# Patient Record
Sex: MALE | Race: WHITE | HISPANIC OR LATINO | Employment: FULL TIME | URBAN - METROPOLITAN AREA
[De-identification: names, ages, dates, MRNs, and addresses within clinical notes are randomized per-mention and may not be internally consistent; named-entity substitution may affect disease eponyms.]

---

## 2017-01-06 ENCOUNTER — CLINICAL SUPPORT (OUTPATIENT)
Dept: DIABETES | Facility: CLINIC | Age: 57
End: 2017-01-06
Payer: COMMERCIAL

## 2017-01-06 VITALS — BODY MASS INDEX: 35.98 KG/M2 | WEIGHT: 251.31 LBS | HEIGHT: 70 IN

## 2017-01-06 DIAGNOSIS — Z79.4 TYPE 2 DIABETES MELLITUS WITH HYPERGLYCEMIA, WITH LONG-TERM CURRENT USE OF INSULIN: ICD-10-CM

## 2017-01-06 DIAGNOSIS — E11.65 TYPE 2 DIABETES MELLITUS WITH HYPERGLYCEMIA, WITH LONG-TERM CURRENT USE OF INSULIN: ICD-10-CM

## 2017-01-06 PROCEDURE — 99999 PR PBB SHADOW E&M-EST. PATIENT-LVL III: CPT | Mod: PBBFAC,,,

## 2017-01-06 PROCEDURE — G0108 DIAB MANAGE TRN  PER INDIV: HCPCS | Mod: S$GLB,,, | Performed by: DIETITIAN, REGISTERED

## 2017-01-06 NOTE — PROGRESS NOTES
01/06/17 0000   Diabetes Type   Diabetes Type  Type II   Diabetes History   Diabetes Diagnosis >10 years   Nutrition   Meal Planning 3 meals per day;water;snacks between meal   Meal Plan 24 Hour Recall - Breakfast (Ham sandwich with coffee or water)   Meal Plan 24 Hour Recall - Lunch (Fruit with nuts and water to drink)   Meal Plan 24 Hour Recall - Dinner (Last night he had chicken soup - had potatoes within it and a salad)   Meal Plan 24 Hour Recall - Snack Snacks on fresh fruit   Exercise    Exercise Type (No routine exerice)   Current Diabetes Treatment    Current Treatment Oral Medicaiton;Injectable;Insulin   Social History   Preferred Learning Method Face to Face   Primary Support Self;Spouse   Educational Level College Graduate   Smoking Status Never a Smoker   Alcohol Use Weekly   Barriers to Change   Barriers to Change None   Learning Challenges  None   Readiness to Learn    Readiness to Learn  Eager   Diabetes Education Visit   Diabetes Education Record Assessment/Progress Initial/Comprehensive   Diabetes Education Assessment/Progress   Acute Complications (preventing, detecting, and treating acute complications) DC   Chronic Complications (preventing, detecting, and treating chronic complications) DC Educated patient on the importance of improving A1C to prevent complications.   Diabetes Disease Process (diabetes disease process and treatment options) DC   Nutrition (Incorporating nutritional management into one's lifestyle) DC Educated patient on plate method with carb limit set to 30-45 grams per meal and 15 grams or less per snack.  Educated patient on how to read nutrition labels for carb, protein, fiber and fat content.  Provided Carb counting and meal planning book for reference. Instructed patient on the importance of eating three times per day.   Physical Activity (incorporating physical activity into one's lifestyle) DCEducated patient on importance of exercise with goal set to exercise 30  minutes per day   Medications (states correct name, dose, onset, peak, duration, side effects & timing of meds) DC - 60 Units of Lantus - patient taking at varying times and he would titrate between 50-60 units pending blood sugar.  Educated patient on importance of taking Lantus 12 hours apart and to take same dose.  Per Dr. New recommendations - set dose to 50 Units BID - he will take at 4:30am and 5:30pm.    Patient also on Victoza 1.2 mg and has tolerated well for 3 weeks, instructed patient to increase to 1.8 mg per Dr. New recommendation    Patient tolerates Metformin 1000mg in am and 1500mg in pm and he is no longer taking Novolog as glucose WNL    Monitoring (monitoring blood glucose/other parameters &using results) DC - Log sheet provided for patient to log and bring to follow up scheduled with Dr. Goldberg on 1/24.   Goal Setting and Problem Solving (verbalizes behavior change strategies & sets realistic goals) DC   Behavior Change (developing personal strategies to health & behavior change) DC   Psychosocial Issues (deveopling personal srategies to address psychosocial concerns) DC   Goals   Healthy Eating Set   Start Date 01/06/17   Physical Activity Set   Start Date 01/06/17   Monitoring In Progress   Medications In Progress   Problem Solving In Progress   Healthy Coping In Progress   Reducing Risks In Progress   Diabetes Care Plan/Intervention   Education Plan/Intervention In F/U DSMT;Endocrine Provider Visit Set Up   Diabetes Self-Management Support Plan F/U Prov   Diabetes Meal Plan   Restrictions Restricted Carbohydrate;Low Fat   Calories 1500   Carbohydrate Per Meal 30-45g   Carbohydrate Per Snack  7-15g   Fat (Reduce intake of saturated fats)   Protein (Lean protein with meals)   Education Units of Time    Time Spent 60 min

## 2017-01-24 ENCOUNTER — LAB VISIT (OUTPATIENT)
Dept: LAB | Facility: HOSPITAL | Age: 57
End: 2017-01-24
Attending: INTERNAL MEDICINE
Payer: COMMERCIAL

## 2017-01-24 ENCOUNTER — OFFICE VISIT (OUTPATIENT)
Dept: ENDOCRINOLOGY | Facility: CLINIC | Age: 57
End: 2017-01-24
Payer: COMMERCIAL

## 2017-01-24 VITALS
SYSTOLIC BLOOD PRESSURE: 172 MMHG | HEIGHT: 70 IN | RESPIRATION RATE: 18 BRPM | BODY MASS INDEX: 35.85 KG/M2 | DIASTOLIC BLOOD PRESSURE: 91 MMHG | HEART RATE: 67 BPM | WEIGHT: 250.44 LBS

## 2017-01-24 DIAGNOSIS — I10 ESSENTIAL HYPERTENSION: ICD-10-CM

## 2017-01-24 DIAGNOSIS — N52.9 VASCULOGENIC ERECTILE DYSFUNCTION, UNSPECIFIED VASCULOGENIC ERECTILE DYSFUNCTION TYPE: ICD-10-CM

## 2017-01-24 DIAGNOSIS — E66.01 SEVERE OBESITY (BMI 35.0-39.9) WITH COMORBIDITY: ICD-10-CM

## 2017-01-24 DIAGNOSIS — K76.0 FATTY INFILTRATION OF LIVER: ICD-10-CM

## 2017-01-24 DIAGNOSIS — E78.1 HYPERTRIGLYCERIDEMIA: ICD-10-CM

## 2017-01-24 DIAGNOSIS — E11.65 TYPE 2 DIABETES MELLITUS WITH HYPERGLYCEMIA, UNSPECIFIED LONG TERM INSULIN USE STATUS: ICD-10-CM

## 2017-01-24 DIAGNOSIS — E11.65 TYPE 2 DIABETES MELLITUS WITH HYPERGLYCEMIA, UNSPECIFIED LONG TERM INSULIN USE STATUS: Primary | ICD-10-CM

## 2017-01-24 DIAGNOSIS — E55.9 HYPOVITAMINOSIS D: ICD-10-CM

## 2017-01-24 LAB
25(OH)D3+25(OH)D2 SERPL-MCNC: 36 NG/ML
ALBUMIN SERPL BCP-MCNC: 3.9 G/DL
ALP SERPL-CCNC: 41 U/L
ALT SERPL W/O P-5'-P-CCNC: 51 U/L
AMYLASE SERPL-CCNC: 110 U/L
ANION GAP SERPL CALC-SCNC: 8 MMOL/L
AST SERPL-CCNC: 46 U/L
BASOPHILS # BLD AUTO: 0.01 K/UL
BASOPHILS NFR BLD: 0.2 %
BILIRUB SERPL-MCNC: 1.7 MG/DL
BUN SERPL-MCNC: 16 MG/DL
CA-I BLDV-SCNC: 1.34 MMOL/L
CALCIUM SERPL-MCNC: 10.2 MG/DL
CHLORIDE SERPL-SCNC: 102 MMOL/L
CHOLEST/HDLC SERPL: 3.2 {RATIO}
CO2 SERPL-SCNC: 28 MMOL/L
CREAT SERPL-MCNC: 1.2 MG/DL
DIFFERENTIAL METHOD: ABNORMAL
EOSINOPHIL # BLD AUTO: 0.1 K/UL
EOSINOPHIL NFR BLD: 0.8 %
ERYTHROCYTE [DISTWIDTH] IN BLOOD BY AUTOMATED COUNT: 13.4 %
EST. GFR  (AFRICAN AMERICAN): >60 ML/MIN/1.73 M^2
EST. GFR  (NON AFRICAN AMERICAN): >60 ML/MIN/1.73 M^2
GLUCOSE SERPL-MCNC: 58 MG/DL
HCT VFR BLD AUTO: 41.7 %
HDL/CHOLESTEROL RATIO: 31.6 %
HDLC SERPL-MCNC: 31 MG/DL
HDLC SERPL-MCNC: 98 MG/DL
HGB BLD-MCNC: 14.1 G/DL
LDLC SERPL CALC-MCNC: 48.4 MG/DL
LIPASE SERPL-CCNC: 61 U/L
LYMPHOCYTES # BLD AUTO: 2.3 K/UL
LYMPHOCYTES NFR BLD: 34.2 %
MCH RBC QN AUTO: 30.9 PG
MCHC RBC AUTO-ENTMCNC: 33.8 %
MCV RBC AUTO: 91 FL
MONOCYTES # BLD AUTO: 0.6 K/UL
MONOCYTES NFR BLD: 8.3 %
NEUTROPHILS # BLD AUTO: 3.7 K/UL
NEUTROPHILS NFR BLD: 56.3 %
NONHDLC SERPL-MCNC: 67 MG/DL
PLATELET # BLD AUTO: 185 K/UL
PMV BLD AUTO: 11.1 FL
POTASSIUM SERPL-SCNC: 3.9 MMOL/L
PROT SERPL-MCNC: 7.6 G/DL
PTH-INTACT SERPL-MCNC: 30 PG/ML
RBC # BLD AUTO: 4.56 M/UL
SODIUM SERPL-SCNC: 138 MMOL/L
TRIGL SERPL-MCNC: 93 MG/DL
URATE SERPL-MCNC: 6.9 MG/DL
WBC # BLD AUTO: 6.63 K/UL

## 2017-01-24 PROCEDURE — 84244 ASSAY OF RENIN: CPT

## 2017-01-24 PROCEDURE — 82330 ASSAY OF CALCIUM: CPT

## 2017-01-24 PROCEDURE — 85025 COMPLETE CBC W/AUTO DIFF WBC: CPT

## 2017-01-24 PROCEDURE — 82306 VITAMIN D 25 HYDROXY: CPT

## 2017-01-24 PROCEDURE — 84378 SUGARS SINGLE QUANT: CPT

## 2017-01-24 PROCEDURE — 84550 ASSAY OF BLOOD/URIC ACID: CPT

## 2017-01-24 PROCEDURE — 3077F SYST BP >= 140 MM HG: CPT | Mod: S$GLB,,, | Performed by: INTERNAL MEDICINE

## 2017-01-24 PROCEDURE — 3080F DIAST BP >= 90 MM HG: CPT | Mod: S$GLB,,, | Performed by: INTERNAL MEDICINE

## 2017-01-24 PROCEDURE — 82088 ASSAY OF ALDOSTERONE: CPT

## 2017-01-24 PROCEDURE — 83970 ASSAY OF PARATHORMONE: CPT

## 2017-01-24 PROCEDURE — 36415 COLL VENOUS BLD VENIPUNCTURE: CPT | Mod: PO

## 2017-01-24 PROCEDURE — 3045F PR MOST RECENT HEMOGLOBIN A1C LEVEL 7.0-9.0%: CPT | Mod: S$GLB,,, | Performed by: INTERNAL MEDICINE

## 2017-01-24 PROCEDURE — 83690 ASSAY OF LIPASE: CPT

## 2017-01-24 PROCEDURE — 99999 PR PBB SHADOW E&M-EST. PATIENT-LVL III: CPT | Mod: PBBFAC,,, | Performed by: INTERNAL MEDICINE

## 2017-01-24 PROCEDURE — 80061 LIPID PANEL: CPT

## 2017-01-24 PROCEDURE — 1159F MED LIST DOCD IN RCRD: CPT | Mod: S$GLB,,, | Performed by: INTERNAL MEDICINE

## 2017-01-24 PROCEDURE — 82308 ASSAY OF CALCITONIN: CPT

## 2017-01-24 PROCEDURE — 83036 HEMOGLOBIN GLYCOSYLATED A1C: CPT

## 2017-01-24 PROCEDURE — 82985 ASSAY OF GLYCATED PROTEIN: CPT

## 2017-01-24 PROCEDURE — 82150 ASSAY OF AMYLASE: CPT

## 2017-01-24 PROCEDURE — 4010F ACE/ARB THERAPY RXD/TAKEN: CPT | Mod: S$GLB,,, | Performed by: INTERNAL MEDICINE

## 2017-01-24 PROCEDURE — 80053 COMPREHEN METABOLIC PANEL: CPT

## 2017-01-24 PROCEDURE — 99214 OFFICE O/P EST MOD 30 MIN: CPT | Mod: S$GLB,,, | Performed by: INTERNAL MEDICINE

## 2017-01-24 RX ORDER — SPIRONOLACTONE 25 MG/1
25 TABLET ORAL DAILY
Qty: 90 TABLET | Refills: 3 | Status: SHIPPED | OUTPATIENT
Start: 2017-01-24 | End: 2017-08-04 | Stop reason: ALTCHOICE

## 2017-01-24 RX ORDER — HYDROCHLOROTHIAZIDE 12.5 MG/1
12.5 TABLET ORAL DAILY
Qty: 90 TABLET | Refills: 3 | Status: SHIPPED | OUTPATIENT
Start: 2017-01-24 | End: 2017-12-22 | Stop reason: SDUPTHER

## 2017-01-24 NOTE — PROGRESS NOTES
Subjective:      Patient ID: Jorge Sharp is a 56 y.o. male.    Chief Complaint:  56 yr old gentleman with type 2 diabetes seen in [facility] today for glycemia optimization.    History of Present Illness    Patient is a 56 yr old gentleman with type 2 diabetes seen in up today for glycemia optimization. He had apparently been seen in the past by Dr Washington but that was over 3 yrs ago. He in addition has essential hypertension, hyperlipidemia with hypercholesterolemia and hyperTG, ED, grade 2 obesity and dysmetabolic syndrome as well as a past history of cholestatic jaundice and NAFLD.  Patients baseline [place] score 13. Patient has not had a sleep study done but has had this recommended.  Patient has been diabetic for ~ 26 yrs.   Patient has a history of major weight changes and got to a maximum weight of 303lbs.  Patient is on metformin 2500mg TDD, Lantus 50 units TWICE DAILY, Novolog 20units TID (of same presently) and Victoza 1.8mcg QD  He does SMBGs 2-4 x daily. Patient has a one touch verio fasting values under 100, HS values; 100-140  Patients most recent HBA1c was 7.0 from 09/16.  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  He has no fresh complaints today.    Review of Systems   Constitutional: Negative for diaphoresis and fatigue.   HENT: Negative for facial swelling and trouble swallowing.    Eyes: Negative for visual disturbance.   Respiratory: Negative for cough, shortness of breath and wheezing.    Cardiovascular: Positive for leg swelling (much improved). Negative for chest pain and palpitations.   Gastrointestinal: Negative for abdominal pain, diarrhea, nausea and vomiting.   Endocrine: Negative for polyuria.   Genitourinary: Negative for dysuria and frequency.   Musculoskeletal: Negative for arthralgias, gait problem and myalgias.   Skin: Negative for color change, pallor and rash.  "  Neurological: Negative for dizziness, light-headedness and headaches.   Hematological: Does not bruise/bleed easily.   Psychiatric/Behavioral: Negative for confusion. The patient is not nervous/anxious.        Objective:  Visit Vitals    BP (!) 172/91    Pulse 67    Resp 18    Ht 5' 10" (1.778 m)    Wt 113.6 kg (250 lb 7.1 oz)    BMI 35.93 kg/m2   BP on recheck; 164/100mmhg     Physical Exam   Constitutional: He is oriented to person, place, and time. He appears well-developed and well-nourished. No distress.   Pleasant middle aged gentleman. Not pale, anicteric and afebrile. Well hydrated and not in any apparent distress.   HENT:   Head: Normocephalic and atraumatic.   Eyes: Conjunctivae and EOM are normal. Pupils are equal, round, and reactive to light. No scleral icterus.   Neck: Normal range of motion. Neck supple. No JVD present. No thyromegaly present.   Cardiovascular: Normal rate, regular rhythm and normal heart sounds.    Pulmonary/Chest: Effort normal and breath sounds normal. No respiratory distress. He has no wheezes.   Abdominal: Soft. There is no tenderness.   Musculoskeletal: Normal range of motion. He exhibits edema (minimal bipedal edema). He exhibits no tenderness.   Neurological: He is alert and oriented to person, place, and time. No cranial nerve deficit.   Skin: Skin is warm and dry. He is not diaphoretic.   Psychiatric: He has a normal mood and affect. His behavior is normal. Judgment and thought content normal.   Vitals reviewed.      Lab Review:     Results for JAIDEN TAN (MRN [id number]) as of 1/24/2017 15:08   Ref. Range 9/16/2016 10:14 9/16/2016 10:23 9/16/2016 10:23   WBC Latest Ref Range: 3.90 - 12.70 K/uL  5.97    RBC Latest Ref Range: 4.60 - 6.20 M/uL  4.60    Hemoglobin Latest Ref Range: 14.0 - 18.0 g/dL  14.7    Hematocrit Latest Ref Range: 40.0 - 54.0 %  42.0    MCV Latest Ref Range: 82 - 98 fL  91    MCH Latest Ref Range: 27.0 - 31.0 pg  32.0 (H)    MCHC Latest " Ref Range: 32.0 - 36.0 %  35.0    RDW Latest Ref Range: 11.5 - 14.5 %  13.1    Platelets Latest Ref Range: 150 - 350 K/uL  140 (L)    MPV Latest Ref Range: 9.2 - 12.9 fL  12.1    Gran% Latest Ref Range: 38.0 - 73.0 %  60.8    Gran # Latest Ref Range: 1.8 - 7.7 K/uL  3.6    Lymph% Latest Ref Range: 18.0 - 48.0 %  26.6    Lymph # Latest Ref Range: 1.0 - 4.8 K/uL  1.6    Mono% Latest Ref Range: 4.0 - 15.0 %  11.1    Mono # Latest Ref Range: 0.3 - 1.0 K/uL  0.7    Eosinophil% Latest Ref Range: 0.0 - 8.0 %  1.2    Eos # Latest Ref Range: 0.0 - 0.5 K/uL  0.1    Basophil% Latest Ref Range: 0.0 - 1.9 %  0.3    Baso # Latest Ref Range: 0.00 - 0.20 K/uL  0.02    Aniso Unknown  Slight    Hypo Unknown  Occasional    Platelet Estimate Unknown  Decreased (A)    Sodium Latest Ref Range: 136 - 145 mmol/L  138    Potassium Latest Ref Range: 3.5 - 5.1 mmol/L  4.7    Chloride Latest Ref Range: 95 - 110 mmol/L  105    CO2 Latest Ref Range: 23 - 29 mmol/L  25    Anion Gap Latest Ref Range: 8 - 16 mmol/L  8    BUN, Bld Latest Ref Range: 6 - 20 mg/dL  16    Creatinine Latest Ref Range: 0.5 - 1.4 mg/dL  1.3    eGFR if non African American Latest Ref Range: >60 mL/min/1.73 m^2  >60.0    eGFR if African American Latest Ref Range: >60 mL/min/1.73 m^2  >60.0    Glucose Latest Ref Range: 70 - 110 mg/dL  219 (H)    Calcium Latest Ref Range: 8.7 - 10.5 mg/dL  9.4    Alkaline Phosphatase Latest Ref Range: 55 - 135 U/L  42 (L)    Total Protein Latest Ref Range: 6.0 - 8.4 g/dL  6.9    Albumin Latest Ref Range: 3.5 - 5.2 g/dL  3.6    Uric Acid Latest Ref Range: 3.4 - 7.0 mg/dL  6.4 6.4   Total Bilirubin Latest Ref Range: 0.1 - 1.0 mg/dL  1.4 (H)    AST Latest Ref Range: 10 - 40 U/L  56 (H)    ALT Latest Ref Range: 10 - 44 U/L  88 (H)    Amylase Latest Ref Range: 20 - 110 U/L  43    Lipase Latest Ref Range: 4 - 60 U/L  27    Vit D, 25-Hydroxy Latest Ref Range: 30 - 96 ng/mL  18 (L)    Hemoglobin A1C Latest Ref Range: 4.5 - 6.2 %  7.0 (H)    Estimated  Avg Glucose Latest Ref Range: 68 - 131 mg/dL  154 (H)    GlycoMark (TM) Latest Units: ug/mL  4.6 (L)    TSH Latest Ref Range: 0.400 - 4.000 uIU/mL  1.798    T3, Total Latest Ref Range: 60 - 180 ng/dL  93    Calcitonin Latest Units: pg/mL  <5.0    Specimen UA Unknown Urine, Clean Catch     Color, UA Latest Ref Range: Yellow, Straw, Margo  Yellow     pH, UA Latest Ref Range: 5.0 - 8.0  6.0     Specific Gravity, UA Latest Ref Range: 1.005 - 1.030  1.020     Appearance, UA Latest Ref Range: Clear  Clear     Protein, UA Latest Ref Range: Negative  Negative     Glucose, UA Latest Ref Range: Negative  3+ (A)     Ketones, UA Latest Ref Range: Negative  Negative     Occult Blood UA Latest Ref Range: Negative  Negative     Nitrite, UA Latest Ref Range: Negative  Negative     Urobilinogen, UA Latest Ref Range: <2.0 EU/dL Negative     Bilirubin (UA) Latest Ref Range: Negative  Negative     Leukocytes, UA Latest Ref Range: Negative  Negative     RBC, UA Latest Ref Range: 0 - 4 /hpf 1     WBC, UA Latest Ref Range: 0 - 5 /hpf 1     Bacteria, UA Latest Ref Range: None-Occ /hpf None     Yeast, UA Latest Ref Range: None  None     Squam Epithel, UA Latest Units: /hpf 1     Microscopic Comment Unknown SEE COMMENT     Microalbum.,U,Random Latest Units: ug/mL 28.0     Microalb Creat Ratio Latest Ref Range: 0.0 - 30.0 ug/mg 15.2     Fructosamine Latest Ref Range: 0 - 285 umol /L  284    Creatinine, Random Ur Latest Ref Range: 23.0 - 375.0 mg/dL 184.0     Differential Method Unknown  Automated        Assessment:     1. Type 2 diabetes mellitus with hyperglycemia, unspecified long term insulin use status  Hemoglobin A1c    Fructosamine    GlycoMark (TM)    Amylase    Calcitonin    Lipase    Comprehensive metabolic panel    CBC auto differential    Uric acid    Urinalysis    Microalbumin/creatinine urine ratio   2. Fatty infiltration of liver     3. Vasculogenic erectile dysfunction, unspecified vasculogenic erectile dysfunction type     4.  Essential hypertension  Comprehensive metabolic panel    Urinalysis    Microalbumin/creatinine urine ratio    Aldosterone    Renin    PTH, intact    Calcium, ionized    hydrochlorothiazide (HYDRODIURIL) 12.5 MG Tab   5. Hypertriglyceridemia  Comprehensive metabolic panel    Lipid panel   6. Hypovitaminosis D  Vitamin D    PTH, intact    Calcium, ionized   7. Severe obesity (BMI 35.0-39.9) with comorbidity  Vitamin D    Uric acid    Lipid panel        Regarding type 2 diabetes; to continue  metformin @  2500mg TDD (add on extra 500mg at PM).  To continue Victoza 1.8mg QD and continue lantus but reduce dose to 40 units BID.  Regarding NAFLD; to track transaminase levels. May consider addition of ursodiol and/or actos if transaminitis recurs.  Regarding obesity; He has achieved some significant weight loss since last visit. To continue efforts at calorie reduction and regular physical activity.   Regarding hypertension; Systolic BP today suboptimally controlled. To add on low dose HCTZ; 12.5mg QD to present antihypertensive regimen. To ensure daily ambulatory BP trends and send in results for review in ~ 1mth.  Regarding hyperlipidemia; most recent lipid panel is essentially at goal. To continue low dose asa as before for primary ASCVD prophylaxis.  Regarding chronic sleep deprivation; much improved with interval weight loss.  Regarding hypovitaminosis D; to continue vitamin D repletion therapy and will recheck 25 Oh vitamin D levels today.    Plan:     FFup in ~ 3mths

## 2017-01-24 NOTE — MR AVS SNAPSHOT
[facility]  [address] [facility] [facility] [address]  Phone: [phone]                  Jorge Sharp   2017 3:00 PM   Office Visit    Description:  Male : 1960   Provider:  Rex Goldberg MD   Department:  [facility]           Diagnoses this Visit        Comments    Type 2 diabetes mellitus with hyperglycemia, unspecified long term insulin use status    -  Primary     Fatty infiltration of liver         Vasculogenic erectile dysfunction, unspecified vasculogenic erectile dysfunction type         Essential hypertension         Hypertriglyceridemia         Hypovitaminosis D         Severe obesity (BMI 35.0-39.9) with comorbidity                To Do List           Future Appointments        Provider Department Dept Phone    2017 4:00 PM LAB, [facility] - Lab [phone]    2017 4:15 PM LAB, [facility] [phone]    3/17/2017 8:00 AM LAB, [facility] - Lab [phone]    3/31/2017 8:40 AM [facility] - [facility] Medicine [phone]    2017 1:30 PM MD [facility] - [facility]Diabetes [phone]      Goals (5 Years of Data)     None       These Medications        Disp Refills Start End    hydrochlorothiazide (HYDRODIURIL) 12.5 MG Tab 90 tablet 3 2017    Take 1 tablet (12.5 mg total) by mouth once daily. - Oral    Pharmacy: [facility] [address] [place] [address] [address]. Ph #: [phone]       spironolactone (ALDACTONE) 25 MG tablet 90 tablet 3 2017    Take 1 tablet (25 mg total) by mouth once daily. - Oral    Pharmacy: [facility] [address] [place], [place] [address] [address]. Ph #: [phone]         [facility]     [facility] Care [facility] -  Assistance  Registered nurses in the [facility] Center provide clinical advisement, health education, appointment booking, and other advisory  services.  Call for this free service at [phone].             Medications           Message regarding Medications     Verify the changes and/or additions to your medication regime listed below are the same as discussed with your clinician today.  If any of these changes or additions are incorrect, please notify your healthcare provider.        START taking these NEW medications        Refills    hydrochlorothiazide (HYDRODIURIL) 12.5 MG Tab 3    Sig: Take 1 tablet (12.5 mg total) by mouth once daily.    Class: Normal    Route: Oral      CHANGE how you are taking these medications     Start Taking Instead of    spironolactone (ALDACTONE) 25 MG tablet spironolactone (ALDACTONE) 25 MG tablet    Dosage:  Take 1 tablet (25 mg total) by mouth once daily. Dosage:  TAKE ONE TABLET BY MOUTH ONCE DAILY    Reason for Change:  Reorder            Verify that the below list of medications is an accurate representation of the medications you are currently taking.  If none reported, the list may be blank. If incorrect, please contact your healthcare provider. Carry this list with you in case of emergency.           Current Medications     COCONUT OIL ORAL Take by mouth.    cod liver oil Cap Take 1 capsule by mouth 2 (two) times daily.    doxazosin (CARDURA) 4 MG tablet TAKE ONE TABLET BY MOUTH ONCE DAILY IN THE EVENING    hydrALAZINE (APRESOLINE) 100 MG tablet Take 1 tablet (100 mg total) by mouth 2 (two) times daily.    insulin aspart (NOVOLOG FLEXPEN) 100 unit/mL InPn pen INJECT 10 TO 20 UNITS SUBCUTANEOUSLY BEFORE LUNCH AND DINNER    insulin glargine (LANTUS SOLOSTAR) 100 unit/mL (3 mL) InPn pen Inject 60 Units into the skin 2 (two) times daily.    lancets (ONE TOUCH DELICA LANCETS) 33 gauge Misc 1 lancet by Misc.(Non-Drug; Combo Route) route 2 hours after meals and at bedtime.    liraglutide 0.6 mg/0.1 mL, 18 mg/3 mL, subq PNIJ (VICTOZA 2-ROSARIO) 0.6 mg/0.1 mL (18 mg/3 mL) PnIj Inject 0.6 mg into the skin once daily.     "liraglutide 0.6 mg/0.1 mL, 18 mg/3 mL, subq PNIJ (VICTOZA 3-ROSARIO) 0.6 mg/0.1 mL (18 mg/3 mL) PnIj Inject 1.2 mg into the skin once daily.    lisinopril (PRINIVIL,ZESTRIL) 40 MG tablet Take 1 tablet (40 mg total) by mouth once daily.    metformin (GLUCOPHAGE) 1000 MG tablet TAKE ONE TABLET BY MOUTH TWICE DAILY WITH MEALS    metoprolol succinate (TOPROL-XL) 100 MG 24 hr tablet TAKE ONE TABLET BY MOUTH ONCE DAILY    multivitamin capsule Take 1 capsule by mouth once daily.    ONETOUCH VERIO Strp CHECK BLOOD SUGAR BEFORE MEALS AND AT BEDTIME (FOUR TIMES A DAY)    pen needle, diabetic (BD ULTRA-FINE MADDIE PEN NEEDLES) 32 gauge x 5/32" Ndle USE AS DIRECTED x 4 a day    spironolactone (ALDACTONE) 25 MG tablet Take 1 tablet (25 mg total) by mouth once daily.    aspirin 81 MG Chew Take 1 tablet (81 mg total) by mouth once daily.    hydrochlorothiazide (HYDRODIURIL) 12.5 MG Tab Take 1 tablet (12.5 mg total) by mouth once daily.    omeprazole (PRILOSEC) 20 MG capsule Take 1 capsule (20 mg total) by mouth once daily.           Clinical Reference Information           Vital Signs - Last Recorded  Most recent update: 1/24/2017  3:02 PM by Frida Bear RN    BP Pulse Resp Ht Wt BMI    (!) 172/91 67 18 5' 10" (1.778 m) 113.6 kg (250 lb 7.1 oz) 35.93 kg/m2      Blood Pressure          Most Recent Value    BP  (!)  172/91      Allergies as of 1/24/2017     No Known Allergies      Immunizations Administered on Date of Encounter - 1/24/2017     None      Orders Placed During Today's Visit     Future Labs/Procedures Expected by Expires    Aldosterone  1/24/2017 3/25/2018    Amylase  1/24/2017 3/25/2018    Calcitonin  1/24/2017 3/25/2018    Calcium, ionized  1/24/2017 3/25/2018    CBC auto differential  1/24/2017 3/25/2018    Comprehensive metabolic panel  1/24/2017 3/25/2018    Fructosamine  1/24/2017 3/25/2018    GlycoMark (TM)  1/24/2017 3/25/2018    Hemoglobin A1c  1/24/2017 3/25/2018    Lipase  1/24/2017 3/25/2018    Lipid panel  " 1/24/2017 3/25/2018    Microalbumin/creatinine urine ratio  1/24/2017 3/25/2018    PTH, intact  1/24/2017 3/25/2018    Renin  1/24/2017 3/25/2018    Uric acid  1/24/2017 3/25/2018    Urinalysis  1/24/2017 3/25/2018    Vitamin D  1/24/2017 3/25/2018

## 2017-01-25 LAB
ESTIMATED AVG GLUCOSE: 126 MG/DL
HBA1C MFR BLD HPLC: 6 %

## 2017-01-26 DIAGNOSIS — I10 ESSENTIAL HYPERTENSION: Chronic | ICD-10-CM

## 2017-01-26 LAB
ALDOST SERPL-MCNC: 9.7 NG/DL
CALCIT SERPL-MCNC: <5 PG/ML

## 2017-01-26 RX ORDER — LISINOPRIL 40 MG/1
40 TABLET ORAL DAILY
Qty: 90 TABLET | Refills: 0 | Status: SHIPPED | OUTPATIENT
Start: 2017-01-26 | End: 2017-04-17 | Stop reason: SDUPTHER

## 2017-01-27 LAB
FRUCTOSAMINE SERPL-SCNC: 241 UMOL /L (ref 0–285)
RENIN PLAS-CCNC: 0.8 NG/ML/H

## 2017-01-28 LAB — GLYCOMARK (TM): 9.4 UG/ML

## 2017-01-29 ENCOUNTER — PATIENT MESSAGE (OUTPATIENT)
Dept: ENDOCRINOLOGY | Facility: CLINIC | Age: 57
End: 2017-01-29

## 2017-03-04 ENCOUNTER — PATIENT MESSAGE (OUTPATIENT)
Dept: FAMILY MEDICINE | Facility: CLINIC | Age: 57
End: 2017-03-04

## 2017-03-04 ENCOUNTER — PATIENT MESSAGE (OUTPATIENT)
Dept: ENDOCRINOLOGY | Facility: CLINIC | Age: 57
End: 2017-03-04

## 2017-03-06 ENCOUNTER — TELEPHONE (OUTPATIENT)
Dept: ENDOCRINOLOGY | Facility: CLINIC | Age: 57
End: 2017-03-06

## 2017-03-06 NOTE — TELEPHONE ENCOUNTER
----- Message from Nisha Deglado sent at 3/6/2017 10:57 AM CST -----  Contact: Jasvir at [facility] [phone]  Jasvir at [facility] [phone] have a question about the qty

## 2017-03-17 ENCOUNTER — LAB VISIT (OUTPATIENT)
Dept: LAB | Facility: HOSPITAL | Age: 57
End: 2017-03-17
Attending: FAMILY MEDICINE
Payer: COMMERCIAL

## 2017-03-17 DIAGNOSIS — E11.65 TYPE 2 DIABETES MELLITUS WITH HYPERGLYCEMIA, WITH LONG-TERM CURRENT USE OF INSULIN: ICD-10-CM

## 2017-03-17 DIAGNOSIS — K76.0 FATTY INFILTRATION OF LIVER: ICD-10-CM

## 2017-03-17 DIAGNOSIS — K75.89 HEPATITIS CHOLESTATIC: ICD-10-CM

## 2017-03-17 DIAGNOSIS — Z79.4 TYPE 2 DIABETES MELLITUS WITH HYPERGLYCEMIA, WITH LONG-TERM CURRENT USE OF INSULIN: ICD-10-CM

## 2017-03-17 LAB
AFP SERPL-MCNC: 1.9 NG/ML
ALBUMIN SERPL BCP-MCNC: 3.9 G/DL
ALP SERPL-CCNC: 43 U/L
ALT SERPL W/O P-5'-P-CCNC: 49 U/L
AMMONIA PLAS-SCNC: 31 UMOL/L
ANION GAP SERPL CALC-SCNC: 7 MMOL/L
AST SERPL-CCNC: 37 U/L
BASOPHILS # BLD AUTO: 0.01 K/UL
BASOPHILS NFR BLD: 0.2 %
BILIRUB SERPL-MCNC: 1.3 MG/DL
BUN SERPL-MCNC: 19 MG/DL
CALCIUM SERPL-MCNC: 10.2 MG/DL
CHLORIDE SERPL-SCNC: 101 MMOL/L
CHOLEST/HDLC SERPL: 5.5 {RATIO}
CO2 SERPL-SCNC: 31 MMOL/L
CREAT SERPL-MCNC: 1.4 MG/DL
DIFFERENTIAL METHOD: ABNORMAL
EOSINOPHIL # BLD AUTO: 0 K/UL
EOSINOPHIL NFR BLD: 0.5 %
ERYTHROCYTE [DISTWIDTH] IN BLOOD BY AUTOMATED COUNT: 13.7 %
EST. GFR  (AFRICAN AMERICAN): >60 ML/MIN/1.73 M^2
EST. GFR  (NON AFRICAN AMERICAN): 56.2 ML/MIN/1.73 M^2
GLUCOSE SERPL-MCNC: 101 MG/DL
HCT VFR BLD AUTO: 43.7 %
HDL/CHOLESTEROL RATIO: 18 %
HDLC SERPL-MCNC: 194 MG/DL
HDLC SERPL-MCNC: 35 MG/DL
HGB BLD-MCNC: 14.6 G/DL
INR PPP: 1
LDLC SERPL CALC-MCNC: 129.2 MG/DL
LYMPHOCYTES # BLD AUTO: 1.6 K/UL
LYMPHOCYTES NFR BLD: 27.6 %
MCH RBC QN AUTO: 31.1 PG
MCHC RBC AUTO-ENTMCNC: 33.4 %
MCV RBC AUTO: 93 FL
MONOCYTES # BLD AUTO: 0.4 K/UL
MONOCYTES NFR BLD: 7.2 %
NEUTROPHILS # BLD AUTO: 3.8 K/UL
NEUTROPHILS NFR BLD: 64.2 %
NONHDLC SERPL-MCNC: 159 MG/DL
PLATELET # BLD AUTO: 186 K/UL
PMV BLD AUTO: 11.2 FL
POTASSIUM SERPL-SCNC: 4.1 MMOL/L
PROT SERPL-MCNC: 7.7 G/DL
PROTHROMBIN TIME: 10.6 SEC
RBC # BLD AUTO: 4.7 M/UL
SODIUM SERPL-SCNC: 139 MMOL/L
TRIGL SERPL-MCNC: 149 MG/DL
WBC # BLD AUTO: 5.84 K/UL

## 2017-03-17 PROCEDURE — 80061 LIPID PANEL: CPT

## 2017-03-17 PROCEDURE — 82140 ASSAY OF AMMONIA: CPT

## 2017-03-17 PROCEDURE — 83036 HEMOGLOBIN GLYCOSYLATED A1C: CPT

## 2017-03-17 PROCEDURE — 85025 COMPLETE CBC W/AUTO DIFF WBC: CPT

## 2017-03-17 PROCEDURE — 36415 COLL VENOUS BLD VENIPUNCTURE: CPT | Mod: PO

## 2017-03-17 PROCEDURE — 82105 ALPHA-FETOPROTEIN SERUM: CPT

## 2017-03-17 PROCEDURE — 85610 PROTHROMBIN TIME: CPT

## 2017-03-17 PROCEDURE — 80053 COMPREHEN METABOLIC PANEL: CPT

## 2017-03-18 LAB
ESTIMATED AVG GLUCOSE: 120 MG/DL
HBA1C MFR BLD HPLC: 5.8 %

## 2017-03-20 ENCOUNTER — DOCUMENTATION ONLY (OUTPATIENT)
Dept: FAMILY MEDICINE | Facility: CLINIC | Age: 57
End: 2017-03-20

## 2017-03-20 NOTE — PROGRESS NOTES
Pre-Visit Chart Review  For Appointment Scheduled on 3/31/17    Health Maintenance Due   Topic Date Due    Influenza Vaccine  08/01/2016

## 2017-03-22 ENCOUNTER — PATIENT MESSAGE (OUTPATIENT)
Dept: FAMILY MEDICINE | Facility: CLINIC | Age: 57
End: 2017-03-22

## 2017-03-22 ENCOUNTER — PATIENT MESSAGE (OUTPATIENT)
Dept: ENDOCRINOLOGY | Facility: CLINIC | Age: 57
End: 2017-03-22

## 2017-03-24 DIAGNOSIS — Z79.4 TYPE 2 DIABETES MELLITUS WITH HYPERGLYCEMIA, WITH LONG-TERM CURRENT USE OF INSULIN: Primary | ICD-10-CM

## 2017-03-24 DIAGNOSIS — E11.65 TYPE 2 DIABETES MELLITUS WITH HYPERGLYCEMIA, WITH LONG-TERM CURRENT USE OF INSULIN: Primary | ICD-10-CM

## 2017-03-24 RX ORDER — INSULIN GLARGINE 100 [IU]/ML
50 INJECTION, SOLUTION SUBCUTANEOUS 2 TIMES DAILY
Qty: 90 ML | Refills: 3 | Status: SHIPPED | OUTPATIENT
Start: 2017-03-24 | End: 2017-08-04

## 2017-03-31 ENCOUNTER — OFFICE VISIT (OUTPATIENT)
Dept: FAMILY MEDICINE | Facility: CLINIC | Age: 57
End: 2017-03-31
Payer: COMMERCIAL

## 2017-03-31 VITALS
SYSTOLIC BLOOD PRESSURE: 150 MMHG | RESPIRATION RATE: 14 BRPM | DIASTOLIC BLOOD PRESSURE: 90 MMHG | OXYGEN SATURATION: 95 % | HEIGHT: 70 IN | WEIGHT: 242.5 LBS | HEART RATE: 73 BPM | BODY MASS INDEX: 34.72 KG/M2

## 2017-03-31 DIAGNOSIS — E11.8 CONTROLLED TYPE 2 DIABETES MELLITUS WITH COMPLICATION, WITH LONG-TERM CURRENT USE OF INSULIN: Primary | ICD-10-CM

## 2017-03-31 DIAGNOSIS — L85.3 DRY SKIN DERMATITIS: ICD-10-CM

## 2017-03-31 DIAGNOSIS — K75.89 HEPATITIS CHOLESTATIC: ICD-10-CM

## 2017-03-31 DIAGNOSIS — E66.9 OBESITY (BMI 30.0-34.9): ICD-10-CM

## 2017-03-31 DIAGNOSIS — K76.0 FATTY INFILTRATION OF LIVER: ICD-10-CM

## 2017-03-31 DIAGNOSIS — Z79.4 CONTROLLED TYPE 2 DIABETES MELLITUS WITH COMPLICATION, WITH LONG-TERM CURRENT USE OF INSULIN: Primary | ICD-10-CM

## 2017-03-31 DIAGNOSIS — I10 ESSENTIAL HYPERTENSION: ICD-10-CM

## 2017-03-31 PROCEDURE — 3044F HG A1C LEVEL LT 7.0%: CPT | Mod: S$GLB,,, | Performed by: PHYSICIAN ASSISTANT

## 2017-03-31 PROCEDURE — 3077F SYST BP >= 140 MM HG: CPT | Mod: S$GLB,,, | Performed by: PHYSICIAN ASSISTANT

## 2017-03-31 PROCEDURE — 3080F DIAST BP >= 90 MM HG: CPT | Mod: S$GLB,,, | Performed by: PHYSICIAN ASSISTANT

## 2017-03-31 PROCEDURE — 99214 OFFICE O/P EST MOD 30 MIN: CPT | Mod: S$GLB,,, | Performed by: PHYSICIAN ASSISTANT

## 2017-03-31 PROCEDURE — 4010F ACE/ARB THERAPY RXD/TAKEN: CPT | Mod: S$GLB,,, | Performed by: PHYSICIAN ASSISTANT

## 2017-03-31 PROCEDURE — 1160F RVW MEDS BY RX/DR IN RCRD: CPT | Mod: S$GLB,,, | Performed by: PHYSICIAN ASSISTANT

## 2017-03-31 PROCEDURE — 99999 PR PBB SHADOW E&M-EST. PATIENT-LVL V: CPT | Mod: PBBFAC,,, | Performed by: PHYSICIAN ASSISTANT

## 2017-03-31 NOTE — PROGRESS NOTES
"Subjective:       Patient ID: Jorge Sharp is a 56 y.o. male.    Chief Complaint: Follow-up (3mth f/u hypertension / diabetes)    HPI Comments: Mr. Sharp comes to clinic today for 3 month follow up. He recently had blood work completed. His HGA1c is 5.8. The patient's lipid panel is stable. CMP shows chronically elevated liver function test; he is overdue to have his abdominal ultrasound updated. The patient reports he did take his blood pressure medication today though his readings remain elevated. The patient does complain of "itchy dry skin" on his back. He would like this evaluated today.    Review of Systems   Constitutional: Negative for activity change, appetite change and fever.   HENT: Negative for postnasal drip, rhinorrhea and sinus pressure.    Eyes: Negative for visual disturbance.   Respiratory: Negative for cough and shortness of breath.    Cardiovascular: Negative for chest pain.   Gastrointestinal: Negative for abdominal distention and abdominal pain.   Genitourinary: Negative for difficulty urinating and dysuria.   Musculoskeletal: Negative for arthralgias and myalgias.   Skin:        "dry itchy skin"   Neurological: Negative for headaches.   Hematological: Negative for adenopathy.   Psychiatric/Behavioral: The patient is not nervous/anxious.        Objective:      Physical Exam   Constitutional: He is oriented to person, place, and time.   HENT:   Mouth/Throat: Oropharynx is clear and moist. No oropharyngeal exudate.   Cardiovascular: Normal rate and regular rhythm.    Pulmonary/Chest: Effort normal and breath sounds normal. He has no wheezes.   Abdominal: Soft. Bowel sounds are normal. There is no tenderness.   Musculoskeletal: He exhibits no edema.   Lymphadenopathy:     He has no cervical adenopathy.   Neurological: He is alert and oriented to person, place, and time.   Skin: No erythema.   Skin of left mid back: dry skin patch. No lesions no rash present   Psychiatric: His " behavior is normal.       Assessment:       1. Controlled type 2 diabetes mellitus with complication, with long-term current use of insulin    2. Hepatitis cholestatic    3. Fatty infiltration of liver    4. Essential hypertension    5. Obesity (BMI 30.0-34.9)    6. Dry skin dermatitis        Plan:   Jorge was seen today for follow-up.    Diagnoses and all orders for this visit:    Controlled type 2 diabetes mellitus with complication, with long-term current use of insulin  -     Hemoglobin A1c; Future  -     Lipid panel; Future  -     Comprehensive metabolic panel; Future  -     CBC auto differential; Future  Diabetic diet  Continue current medication  Hepatitis cholestatic  -     US Abdomen Complete; Future    Fatty infiltration of liver  -     US Abdomen Complete; Future    Essential hypertension  -     Hemoglobin A1c; Future  -     Lipid panel; Future  -     Comprehensive metabolic panel; Future  -     CBC auto differential; Future  Uncontrolled  Low salt diet  Blood pressure check in 2 weeks   Patient to monitor and record blood pressure. Patient to bring readings and BP meter to upcoming blood pressure check  Obesity (BMI 30.0-34.9)  Low carbohydrate, high fiber diet  Increase exercise as able    Dry skin dermatitis  Cerave and Sarna OTC recommended  If no resolution, dermatology referral.         Patient readiness: acceptance and barriers:none    During the course of the visit the patient was educated and counseled about the following:     Diabetes:  Discussed general issues about diabetes pathophysiology and management.  Educational material distributed.  Addressed ADA diet.  Suggested low cholesterol diet.  Encouraged aerobic exercise.  Discussed foot care.  Reminded to get yearly retinal exam.  Hypertension:   Medication: no change.  Dietary sodium restriction.  Regular aerobic exercise.  Obesity:   General weight loss/lifestyle modification strategies discussed (elicit support from others; identify  saboteurs; non-food rewards, etc).  Informal exercise measures discussed, e.g. taking stairs instead of elevator.  Regular aerobic exercise program discussed.    Goals: Diabetes: Maintain Hemoglobin A1C below 7, Hypertension: Reduce Blood Pressure and Obesity: Reduce calorie intake and BMI    Did patient meet goals/outcomes: No    The following self management tools provided: declined    Patient Instructions (the written plan) was given to the patient/family.     Time spent with patient: 30 minutes

## 2017-03-31 NOTE — MR AVS SNAPSHOT
[facility]  [address]  [place] [address]  Phone: [phone]  Fax: [phone]                  Jorge Sharp   3/31/2017 8:40 AM   Office Visit    Description:  Male : 1960   Provider:  Hailey King PA-C   Department:  [facility]           Reason for Visit     Follow-up           Diagnoses this Visit        Comments    Controlled type 2 diabetes mellitus with complication, with long-term current use of insulin    -  Primary     Hepatitis cholestatic         Fatty infiltration of liver         Essential hypertension                To Do List           Future Appointments        Provider Department Dept Phone    2017 7:30 AM [facility]- Ultrasound [phone]    2017 8:00 AM Hailey King PA-C [facility] Medicine [phone]    2017 1:30 PM MD [place] [facility] - Endo/Diabetes [phone]    2017 8:15 AM LAB, [facility] - Lab [phone]    2017 7:30 AM Elias Allen MD [facility] Family Medicine [phone]      Goals (5 Years of Data)     None      [facility]     [facility] -  Assistance  Unless otherwise directed by your provider, please contact [facility], our nurse care line that is available for  assistance.     Registered nurses in the [facility] provide: appointment scheduling, clinical advisement, health education, and other advisory services.  Call: [phone] (toll free)               Medications           Message regarding Medications     Verify the changes and/or additions to your medication regime listed below are the same as discussed with your clinician today.  If any of these changes or additions are incorrect, please notify your healthcare provider.             Verify that the below list of medications is an accurate representation of the medications you are currently taking.  If none reported, the  "list may be blank. If incorrect, please contact your healthcare provider. Carry this list with you in case of emergency.           Current Medications     COCONUT OIL ORAL Take by mouth.    cod liver oil Cap Take 1 capsule by mouth 2 (two) times daily.    doxazosin (CARDURA) 4 MG tablet TAKE ONE TABLET BY MOUTH ONCE DAILY IN THE EVENING    hydrALAZINE (APRESOLINE) 100 MG tablet Take 1 tablet (100 mg total) by mouth 2 (two) times daily.    hydrochlorothiazide (HYDRODIURIL) 12.5 MG Tab Take 1 tablet (12.5 mg total) by mouth once daily.    insulin glargine (BASAGLAR KWIKPEN) 100 unit/mL (3 mL) InPn pen Inject 50 Units into the skin 2 (two) times daily.    lancets (ONE TOUCH DELICA LANCETS) 33 gauge Misc 1 lancet by Misc.(Non-Drug; Combo Route) route 2 hours after meals and at bedtime.    liraglutide 0.6 mg/0.1 mL, 18 mg/3 mL, subq PNIJ (VICTOZA 2-ROSARIO) 0.6 mg/0.1 mL (18 mg/3 mL) PnIj Inject 0.6 mg into the skin once daily.    lisinopril (PRINIVIL,ZESTRIL) 40 MG tablet Take 1 tablet (40 mg total) by mouth once daily.    metformin (GLUCOPHAGE) 1000 MG tablet TAKE ONE TABLET BY MOUTH TWICE DAILY WITH MEALS    metoprolol succinate (TOPROL-XL) 100 MG 24 hr tablet TAKE ONE TABLET BY MOUTH ONCE DAILY    multivitamin capsule Take 1 capsule by mouth once daily.    omeprazole (PRILOSEC) 20 MG capsule Take 1 capsule (20 mg total) by mouth once daily.    ONEUnfold VERIO Strp CHECK BLOOD SUGAR BEFORE MEALS AND AT BEDTIME (FOUR TIMES A DAY)    pen needle, diabetic (BD ULTRA-FINE MADDIE PEN NEEDLES) 32 gauge x 5/32" Ndle USE AS DIRECTED x 4 a day    spironolactone (ALDACTONE) 25 MG tablet Take 1 tablet (25 mg total) by mouth once daily.    aspirin 81 MG Chew Take 1 tablet (81 mg total) by mouth once daily.    insulin aspart (NOVOLOG FLEXPEN) 100 unit/mL InPn pen INJECT 10 TO 20 UNITS SUBCUTANEOUSLY BEFORE LUNCH AND DINNER           Clinical Reference Information           Your Vitals Were     BP Pulse Resp Height Weight SpO2    150/90 " "(BP Location: Right arm, Patient Position: Sitting, BP Method: Manual) 73 14 5' 10" (1.778 m) 110 kg (242 lb 8.1 oz) 95%    BMI                34.8 kg/m2          Blood Pressure          Most Recent Value    BP  (!)  150/90      Allergies as of 3/31/2017     No Known Allergies      Immunizations Administered on Date of Encounter - 3/31/2017     None      Orders Placed During Today's Visit     Future Labs/Procedures Expected by Expires    CBC auto differential  3/31/2017 5/30/2018    Comprehensive metabolic panel  3/31/2017 5/30/2018    Hemoglobin A1c  3/31/2017 3/31/2018    Lipid panel  3/31/2017 5/30/2018    US Abdomen Complete  3/31/2017 4/1/2018      Instructions    Cerave- dry skin  Sarna- lotion for itching  Both can be found OTC       Language Assistance Services     ATTENTION: Language assistance services are available, free of charge. Please call [phone].      ATENCIÓN: Si habla español, tiene a salvador disposición servicios gratuitos de asistencia lingüística. Llame al [phone].     CHÚ Ý: N?u b?n nói Ti?ng Vi?t, có các d?ch v? h? tr? ngôn ng? mi?n phí dành cho b?n. G?i s? [phone].         [facility] complies with applicable Federal civil rights laws and does not discriminate on the basis of race, color, national origin, age, disability, or sex.        "

## 2017-04-11 ENCOUNTER — DOCUMENTATION ONLY (OUTPATIENT)
Dept: FAMILY MEDICINE | Facility: CLINIC | Age: 57
End: 2017-04-11

## 2017-04-11 NOTE — PROGRESS NOTES
Pre-Visit Chart Review  For Appointment Scheduled on 4/17/17    There are no preventive care reminders to display for this patient.

## 2017-04-12 NOTE — PATIENT INSTRUCTIONS
Established High Blood Pressure    High blood pressure (hypertension) is a chronic disease. Often health care providers dont know what causes it. But it can be caused by certain health conditions and medicines.  If you have high blood pressure, you may not have any symptoms. If you do have symptoms, they may include headache, dizziness, changes in your vision, chest pain, and shortness of breath. But even without symptoms, high blood pressure thats not treated raises your risk for heart attack and stroke. High blood pressure is a serious health risk and shouldnt be ignored.  A blood pressure reading is made up of two numbers: a higher number over a lower number. The top number is the systolic pressure. The bottom number is the diastolic pressure. A normal blood pressure is less than 120 over less than 80.  High blood pressure is when either the top number is 140 or higher, or the bottom number is 90 or higher. This must be the result when taking your blood pressure a number of times. The blood pressures between normal and high are called prehypertension.  Home care  If you have high blood pressure, you should do what is listed below to lower your blood pressure. If you are taking medicines for high blood pressure, these methods may reduce or end your need for medicines in the future.  · Begin a weight-loss program if you are overweight.  · Cut back on how much salt you get in your diet. Heres how to do this:  ¨ Dont eat foods that have a lot of salt. These include olives, pickles, smoked meats, and salted potato chips.  ¨ Dont add salt to your food at the table.  ¨ Use only small amounts of salt when cooking.  · Begin an exercise program. Talk with your health care provider about the type of exercise program that would be best for you. It doesn't have to be hard. Even brisk walking for 20 minutes 3 times a week is a good form of exercise.  · Dont take medicines that have heart stimulants. This includes many  cold and sinus decongestant pills and sprays, as well as diet pills. Check the warnings about hypertension on the label. Stimulants such as amphetamine or cocaine could be lethal for someone with high blood pressure. Never take these.  · Limit how much caffeine you get in your diet. Switch to caffeine-free products.  · Stop smoking. If you are a long-time smoker, this can be hard. Enroll in a stop-smoking program to make it more likely that you will quit for good.  · Learn how to handle stress. This is an important part of any program to lower blood pressure. Learn about relaxation methods like meditation, yoga, or biofeedback.  · If your provider prescribed medicines, take them exactly as directed. Missing doses may cause your blood pressure get out of control.  · Consider buying an automatic blood pressure machine. You can get one of these at most pharmacies. Use this to watch your blood pressure at home. Give the results to your provider.  Follow-up care  You will need to make regular visits to your health care provider. This is to check your blood pressure and to make changes to your medicines. Make a follow-up appointment as directed.  When to seek medical advice  Call your health care provider right away if any of these occur:  · Chest pain or shortness of breath  · Severe headache  · Throbbing or rushing sound in the ears  · Nosebleed  · Sudden severe pain in your belly (abdomen)  · Extreme drowsiness, confusion, or fainting  · Dizziness or dizziness with a spinning sensation (vertigo)  · Weakness of an arm or leg or one side of the face  · You have problems speaking or seeing   Date Last Reviewed: 11/25/2014  © 1769-6520 [employer]. [address] [place] [address], [place] [address]. All rights reserved. This information is not intended as a substitute for professional medical care. Always follow your healthcare professional's instructions.            Diabetes (General Information)  Diabetes is a  long-term health problem. It means your body does not make enough insulin. Or it may mean that your body cannot use the insulin it makes. Insulin is a hormone in your body. It lets blood sugar (glucose) reach the cells in your body. All of your cells need glucose for fuel.  When you have diabetes, the glucose in your blood builds up because it cannot get into the cells. This buildup is called high blood sugar (hyperglycemia).  Your blood sugar level depends on several things. It depends on what kind of food you eat and how much of it you eat. It also depends on how much exercise you get, and how much insulin you have in your body. Eating too much of the wrong kinds of food or not taking diabetes medicine on time can cause high blood sugar. Infections can cause high blood sugar even if you are taking medicines correctly.  These things can also cause low blood sugar:  · Missing meals  · Not eating enough food  · Taking too much diabetes medicine  Diabetes can cause serious problems over time if you do not get treated. These problems include heart disease, stroke, kidney failure, and blindness. They also include nerve pain or loss of feeling in your legs and feet, and gangrene of the feet. By keeping your blood sugar under control you can prevent or delay these problems.  Normal blood sugar levels are 80 to 100 before a meal and less than 180 in the 1 to 2 hours after a meal.  Home care  Follow these guidelines when caring for yourself at home:  · Follow the diet your healthcare provider gives you. Take insulin or other diabetes medicine exactly as told to.  · Watch your blood sugar as you are told to. Keep a log of your results. This will help your provider change your medicines to keep your blood sugar under control.  · Try to reach your ideal weight. You may be able to cut back on or not have to take diabetes medicine if you eat the right foods and get exercise.  · Do not smoke. Smoking worsens the effects of  diabetes on your circulation. You are much more likely to have a heart attack if you have diabetes and you smoke.  · Take good care of your feet. If you have lost feeling in your feet, you may not see an injury or infection. Check your feet and between your toes at least once a week.  · Wear a medical alert bracelet or necklace, or carry a card in your wallet that says you have diabetes. This will help healthcare providers give you the right care if you get very ill and cannot tell them that you have diabetes.  Sick day plan  If you get a cold, the flu, or a bacterial or viral infection, take these steps:  · Look at your diabetes sick plan and call your healthcare provider as you were told to. You may need to call your provider right away if:  ¨ Your blood sugar is above 240 while taking your diabetes medicine  ¨ Your urine ketone levels are above normal or high  ¨ You have been vomiting more than 6 hours  ¨ You have trouble breathing or your breath ha s a fruity smell  ¨ You have a high fever  ¨ You have a fever for several days and you are not getting better  ¨ You get light-headed and are sleepier than usual  · Keep taking your diabetes pills (oral medicine) even if you have been vomiting and are feeling sick. Call your provider right away because you may need insulin to lower your blood sugar until you recover from your illness.  · Keep taking your insulin even if you have been vomiting and are feeling sick. Call your provider right away to ask if you need to change your insulin dose. This will depend on your blood sugar results.  · Check your blood sugar every 2 to 4 hours, or at least 4 times a day.  · Check your ketones often. If you are vomiting and having diarrhea, watch them more often.  · Do not skip meals. Try to eat small meals on a regular schedule. Do this even if you do not feel like eating.  · Drink water or other liquids that do not have caffeine or calories. This will keep you from getting  dehydrated. If you are nauseated or vomiting, takes small sips every 5 minutes. To prevent dehydration try to drink a cup (8 ounces) of fluids every hour while you are awake.  General care  Always bring a source of fast-acting sugar with you in case you have symptoms of low blood sugar (below 70). At the first sign of low blood sugar, eat or drink 15 to 20 grams of fast-acting sugar to raise your blood sugar. Examples are:  · 3 to 4 glucose tablets. You can buy these at most [employer].  · 4 ounces (1/2 cup) of regular (not diet) soft drinks  · 4 ounces (1/2 cup) of any fruit juice  · 8 ounces (1 cup) of milk  · 5 to 6 pieces of hard candy  · 1 tablespoon of honey  Check your blood sugar 15 minutes after treating yourself. If it is still below 70, take 15 to 20 more grams of fast-acting sugar. Test again in 15 minutes. If it returns to normal (70 or above), eat a snack or meal to keep your blood sugar in a safe range. If it stays low, call your doctor or go to an emergency room.  Follow-up care  Follow-up with your healthcare provider, or as advised. For more information about diabetes, visit the American Diabetes Association website at www.diabetes.org or call 701-658-3997.  When to seek medical advice  Call your healthcare provider right away if you have any of these symptoms of high blood sugar:  · Frequent urination  · Dizziness  · Drowsiness  · Thirst  · Headache  · Nausea or vomiting  · Abdominal pain  · Eyesight changes  · Fast breathing  · Confusion or loss of consciousness  Also call your provider right away if you have any of these signs of low blood sugar:  · Fatigue  · Headache  · Shakes  · Excess sweating  · Hunger  · Feeling anxious or restless  · Eyesight changes  · Drowsiness  · Weakness  · Confusion or loss of consciousness  Call 911  Call for emergency help right away if any of these occur:  · Chest pain or shortness of breath  · Dizziness or fainting  · Weakness of an arm or leg or one side of the  face  · Trouble speaking or seeing   Date Last Reviewed: 6/1/2016 © 2000-2016 [employer]. [address] [place] [address], [place] [address]. All rights reserved. This information is not intended as a substitute for professional medical care. Always follow your healthcare professional's instructions.            Walking for Fitness  Fitness walking has something for everyone, even people who are already fit. Walking is one of the safest ways to condition your body aerobically. It can boost energy, help you lose weight, and reduce stress.    Physical benefits  · Walking strengthens your heart and lungs, and tones your muscles.  · When walking, your feet land with less impact than in other sports. This reduces chances of muscle, bone, and joint injury.  · Regular walking improves your cholesterol levels and lowers your risk of heart disease. And it helps you control your blood sugar if you have diabetes.  · Walking is a weight-bearing activity, which helps maintain bone density. This can help prevent osteoporosis.  Personal rewards  · Taking walks can help you relax and manage stress. And fitness walking may make you feel better about yourself.  · Walking can help you sleep better at night and make you less likely to be depressed.  · Regular walking may help maintain your memory as you get older.  · Walking is a great way to spend extra time with friends and family members. Be sure to invite your dog along!  Q&A about fitness walking  Q: Will walking keep me fit?  A: Yes. Regular walking at the right pace gives you all the benefits of other aerobic activities, such as jogging and swimming.  Q: Will walking help me lose weight and keep it off?  A: Yes. Per mile, walking can burn as many calories as jogging. Your health care provider can help work walking into your weight-loss plan.  Q: Is walking safe for my health?  A: Yes. Walking is safe if you have high blood pressure, diabetes, heart disease, or other  conditions. Talk to your health care provider before you start.  Date Last Reviewed: 5/9/2015  © 5831-3045 [employer]. [address] [place] [address], [place] [address]. All rights reserved. This information is not intended as a substitute for professional medical care. Always follow your healthcare professional's instructions.            Weight Management: Getting Started  Healthy bodies come in all shapes and sizes. Not all bodies are made to be thin. For some people, a healthy weight is higher than the average weight listed on weight charts. Your healthcare provider can help you decide on a healthy weight for you.    Reasons to lose weight  Losing weight can help with some health problems, such as high blood pressure, heart disease, diabetes, sleep apnea, and arthritis. You may also feel more energy.  Set your long-term goal  Your goal doesn't even have to be a specific weight. You may decide on a fitness goal (such as being able to walk 10 miles a week), or a health goal (such as lowering your blood pressure). Choose a goal that is measurable and reasonable, so you know when you've reached it. A goal of reaching a BMI of less than 25 is not always reasonable (or possible).   Make an action plan  Habits dont change overnight. Setting your goals too high can leave you feeling discouraged if you cant reach them. Be realistic. Choose one or two small changes you can make now. Set an action plan for how you are going to make these changes. When you can stick to this plan, keep making a few more small changes. Taking small steps will help you stay on the path to success.  Track your progress  Write down your goals. Then, keep a daily record of your progress. Write down what you eat and how active you are. This record lets you look back on how much youve done. It may also help when youre feeling frustrated. Reward yourself for success. Even if you dont reach every goal, give yourself credit for what you do  get done.  Get support  Encouragement from others can help make losing weight easier. Ask your family members and friends for support. They may even want to join you. Also look to your healthcare provider, registered dietitian, and  for help. Your local hospital can give you more information about nutrition, exercise, and weight loss.  Date Last Reviewed: 1/31/2016  © 3057-4904 [employer] [address] [place] [address], [place] [address]. All rights reserved. This information is not intended as a substitute for professional medical care. Always follow your healthcare professional's instructions.

## 2017-04-17 ENCOUNTER — HOSPITAL ENCOUNTER (OUTPATIENT)
Dept: RADIOLOGY | Facility: CLINIC | Age: 57
Discharge: HOME | End: 2017-04-17
Attending: FAMILY MEDICINE
Payer: COMMERCIAL

## 2017-04-17 ENCOUNTER — PATIENT MESSAGE (OUTPATIENT)
Dept: FAMILY MEDICINE | Facility: CLINIC | Age: 57
End: 2017-04-17

## 2017-04-17 ENCOUNTER — CLINICAL SUPPORT (OUTPATIENT)
Dept: FAMILY MEDICINE | Facility: CLINIC | Age: 57
End: 2017-04-17
Payer: COMMERCIAL

## 2017-04-17 ENCOUNTER — TELEPHONE (OUTPATIENT)
Dept: FAMILY MEDICINE | Facility: CLINIC | Age: 57
End: 2017-04-17

## 2017-04-17 VITALS — DIASTOLIC BLOOD PRESSURE: 92 MMHG | SYSTOLIC BLOOD PRESSURE: 150 MMHG | HEART RATE: 68 BPM

## 2017-04-17 DIAGNOSIS — I10 ESSENTIAL HYPERTENSION: Chronic | ICD-10-CM

## 2017-04-17 DIAGNOSIS — K75.89 HEPATITIS CHOLESTATIC: ICD-10-CM

## 2017-04-17 DIAGNOSIS — K76.0 FATTY INFILTRATION OF LIVER: ICD-10-CM

## 2017-04-17 DIAGNOSIS — I10 ESSENTIAL HYPERTENSION: Primary | Chronic | ICD-10-CM

## 2017-04-17 PROCEDURE — 76700 US EXAM ABDOM COMPLETE: CPT | Mod: 26,,, | Performed by: RADIOLOGY

## 2017-04-17 PROCEDURE — 99999 PR PBB SHADOW E&M-EST. PATIENT-LVL III: CPT | Mod: PBBFAC,,, | Performed by: PHYSICIAN ASSISTANT

## 2017-04-17 PROCEDURE — 76700 US EXAM ABDOM COMPLETE: CPT | Mod: TC,PO

## 2017-04-17 RX ORDER — AMLODIPINE BESYLATE 5 MG/1
5 TABLET ORAL DAILY
Qty: 90 TABLET | Refills: 1 | Status: SHIPPED | OUTPATIENT
Start: 2017-04-17 | End: 2017-08-13 | Stop reason: SDUPTHER

## 2017-04-17 RX ORDER — LISINOPRIL 40 MG/1
40 TABLET ORAL DAILY
Qty: 90 TABLET | Refills: 0 | Status: SHIPPED | OUTPATIENT
Start: 2017-04-17 | End: 2017-06-01 | Stop reason: SDUPTHER

## 2017-04-17 NOTE — PROGRESS NOTES
Subjective:       Patient ID: Jorge Sharp is a 57 y.o. male.    Chief Complaint: Hypertension (Nurse Visit)    HPI  Review of Systems    Objective:      Physical Exam    Assessment:       1. Essential hypertension        Plan:   Jorge was seen today for hypertension.    Diagnoses and all orders for this visit:    Essential hypertension  -     amlodipine (NORVASC) 5 MG tablet; Take 1 tablet (5 mg total) by mouth once daily.  Uncontrolled  Continue current medication  Add norvasc. Recheck blood pressure in 2 weeks.

## 2017-04-17 NOTE — TELEPHONE ENCOUNTER
Amlodipine 5mg added to patient's blood pressure medication. This was sent to local pharmacy. Please schedule follow up nurse visit in 2 weeks.

## 2017-04-17 NOTE — PROGRESS NOTES
Patient present for nurse blood pressure check. Presently denies any pain, HA, or dizziness. Verbalizes taking all medications a prescribed. Blood pressure assessed per robot in right arm. Patient was sitting up straight with feet flat on the floor, arm resting on desk with result of 148/90 Pulse 68. Blood pressure reassessed manually with result of 150/92.

## 2017-04-21 ENCOUNTER — DOCUMENTATION ONLY (OUTPATIENT)
Dept: FAMILY MEDICINE | Facility: CLINIC | Age: 57
End: 2017-04-21

## 2017-04-21 NOTE — PROGRESS NOTES
Pre-Visit Chart Review  For Appointment Scheduled on 5/5/17    There are no preventive care reminders to display for this patient.

## 2017-05-12 ENCOUNTER — PATIENT MESSAGE (OUTPATIENT)
Dept: HEPATOLOGY | Facility: CLINIC | Age: 57
End: 2017-05-12

## 2017-05-12 ENCOUNTER — LAB VISIT (OUTPATIENT)
Dept: LAB | Facility: HOSPITAL | Age: 57
End: 2017-05-12
Payer: COMMERCIAL

## 2017-05-12 ENCOUNTER — PROCEDURE VISIT (OUTPATIENT)
Dept: HEPATOLOGY | Facility: CLINIC | Age: 57
End: 2017-05-12
Attending: NURSE PRACTITIONER
Payer: COMMERCIAL

## 2017-05-12 ENCOUNTER — OFFICE VISIT (OUTPATIENT)
Dept: HEPATOLOGY | Facility: CLINIC | Age: 57
End: 2017-05-12
Payer: COMMERCIAL

## 2017-05-12 VITALS
BODY MASS INDEX: 34.28 KG/M2 | RESPIRATION RATE: 18 BRPM | HEART RATE: 84 BPM | HEIGHT: 70 IN | SYSTOLIC BLOOD PRESSURE: 142 MMHG | DIASTOLIC BLOOD PRESSURE: 78 MMHG | OXYGEN SATURATION: 95 % | WEIGHT: 239.44 LBS

## 2017-05-12 DIAGNOSIS — R76.8 HEPATITIS C ANTIBODY TEST POSITIVE: ICD-10-CM

## 2017-05-12 DIAGNOSIS — K76.0 NAFLD (NONALCOHOLIC FATTY LIVER DISEASE): Primary | ICD-10-CM

## 2017-05-12 DIAGNOSIS — K76.0 NAFLD (NONALCOHOLIC FATTY LIVER DISEASE): ICD-10-CM

## 2017-05-12 LAB
BILIRUB DIRECT SERPL-MCNC: 0.3 MG/DL
CERULOPLASMIN SERPL-MCNC: 19 MG/DL
FERRITIN SERPL-MCNC: 126 NG/ML
HBV CORE AB SERPL QL IA: NEGATIVE
HBV SURFACE AB SER-ACNC: NEGATIVE M[IU]/ML
HEPATITIS A ANTIBODY, IGG: POSITIVE
IGG SERPL-MCNC: 1293 MG/DL
IRON SERPL-MCNC: 105 UG/DL
SATURATED IRON: 31 %
TOTAL IRON BINDING CAPACITY: 336 UG/DL
TRANSFERRIN SERPL-MCNC: 227 MG/DL

## 2017-05-12 PROCEDURE — 86235 NUCLEAR ANTIGEN ANTIBODY: CPT | Mod: 91

## 2017-05-12 PROCEDURE — 82390 ASSAY OF CERULOPLASMIN: CPT

## 2017-05-12 PROCEDURE — 36415 COLL VENOUS BLD VENIPUNCTURE: CPT

## 2017-05-12 PROCEDURE — 82248 BILIRUBIN DIRECT: CPT

## 2017-05-12 PROCEDURE — 82104 ALPHA-1-ANTITRYPSIN PHENO: CPT

## 2017-05-12 PROCEDURE — 83540 ASSAY OF IRON: CPT

## 2017-05-12 PROCEDURE — 82728 ASSAY OF FERRITIN: CPT

## 2017-05-12 PROCEDURE — 86038 ANTINUCLEAR ANTIBODIES: CPT

## 2017-05-12 PROCEDURE — 86790 VIRUS ANTIBODY NOS: CPT

## 2017-05-12 PROCEDURE — 86706 HEP B SURFACE ANTIBODY: CPT

## 2017-05-12 PROCEDURE — 86704 HEP B CORE ANTIBODY TOTAL: CPT

## 2017-05-12 PROCEDURE — 91200 LIVER ELASTOGRAPHY: CPT | Mod: S$GLB,,, | Performed by: NURSE PRACTITIONER

## 2017-05-12 PROCEDURE — 86256 FLUORESCENT ANTIBODY TITER: CPT

## 2017-05-12 PROCEDURE — 87522 HEPATITIS C REVRS TRNSCRPJ: CPT

## 2017-05-12 PROCEDURE — 3077F SYST BP >= 140 MM HG: CPT | Mod: S$GLB,,, | Performed by: NURSE PRACTITIONER

## 2017-05-12 PROCEDURE — 82784 ASSAY IGA/IGD/IGG/IGM EACH: CPT

## 2017-05-12 PROCEDURE — 1160F RVW MEDS BY RX/DR IN RCRD: CPT | Mod: S$GLB,,, | Performed by: NURSE PRACTITIONER

## 2017-05-12 PROCEDURE — 99999 PR PBB SHADOW E&M-EST. PATIENT-LVL IV: CPT | Mod: PBBFAC,,, | Performed by: NURSE PRACTITIONER

## 2017-05-12 PROCEDURE — 3078F DIAST BP <80 MM HG: CPT | Mod: S$GLB,,, | Performed by: NURSE PRACTITIONER

## 2017-05-12 PROCEDURE — 99215 OFFICE O/P EST HI 40 MIN: CPT | Mod: S$GLB,,, | Performed by: NURSE PRACTITIONER

## 2017-05-12 RX ORDER — METFORMIN HYDROCHLORIDE 500 MG/1
500 TABLET ORAL NIGHTLY
COMMUNITY
Start: 2017-04-17 | End: 2017-08-04

## 2017-05-12 RX ORDER — ERGOCALCIFEROL 1.25 MG/1
CAPSULE ORAL
COMMUNITY
Start: 2017-04-17 | End: 2017-08-04

## 2017-05-12 NOTE — MR AVS SNAPSHOT
[facility] - Hepatology  [address]  [facility] [address]  Phone: [phone]  Fax: [phone]                  Jorge Sharp   2017 10:40 AM   Office Visit    Description:  Male : 1960   Provider:  Jenise Miller NP   Department:  [facility] - Hepatology           Reason for Visit     Hepatitis cholestatic     Fatty Liver           Diagnoses this Visit        Comments    NAFLD (nonalcoholic fatty liver disease)    -  Primary     Hepatitis C antibody test positive                To Do List           Future Appointments        Provider Department Dept Phone    2017 11:35 AM LAB, TRANSPLANT [facility] [phone]    2017 2:30 PM [facility] HEPATOLOGY, [facility]ology [phone]    2017 1:30 PM MD Bozena Stern - Endo/Diabetes [phone]    2017 8:15 AM [facility]BOZENA [facility] - Lab [phone]    2017 7:40 AM MD Lee Gonzalezll - Family Medicine [phone]      Goals (5 Years of Data)     None      [facility] On Call     [facility] Nurse Care Line -  Assistance  Unless otherwise directed by your provider, please contact [facility], our nurse care line that is available for  assistance.     Registered nurses in the [facility] provide: appointment scheduling, clinical advisement, health education, and other advisory services.  Call: [phone] (toll free)               Medications           Message regarding Medications     Verify the changes and/or additions to your medication regime listed below are the same as discussed with your clinician today.  If any of these changes or additions are incorrect, please notify your healthcare provider.        STOP taking these medications     COCONUT OIL ORAL Take by mouth.    omeprazole (PRILOSEC) 20 MG capsule Take 1 capsule (20 mg total) by mouth once daily.           Verify that the below list of medications is an  "accurate representation of the medications you are currently taking.  If none reported, the list may be blank. If incorrect, please contact your healthcare provider. Carry this list with you in case of emergency.           Current Medications     amlodipine (NORVASC) 5 MG tablet Take 1 tablet (5 mg total) by mouth once daily.    cod liver oil Cap Take 1 capsule by mouth 2 (two) times daily.    doxazosin (CARDURA) 4 MG tablet TAKE ONE TABLET BY MOUTH ONCE DAILY IN THE EVENING    hydrALAZINE (APRESOLINE) 100 MG tablet Take 1 tablet (100 mg total) by mouth 2 (two) times daily.    insulin aspart (NOVOLOG FLEXPEN) 100 unit/mL InPn pen INJECT 10 TO 20 UNITS SUBCUTANEOUSLY BEFORE LUNCH AND DINNER    insulin glargine (BASAGLAR KWIKPEN) 100 unit/mL (3 mL) InPn pen Inject 50 Units into the skin 2 (two) times daily.    liraglutide 0.6 mg/0.1 mL, 18 mg/3 mL, subq PNIJ (VICTOZA 2-ROSARIO) 0.6 mg/0.1 mL (18 mg/3 mL) PnIj Inject 0.6 mg into the skin once daily.    lisinopril (PRINIVIL,ZESTRIL) 40 MG tablet Take 1 tablet (40 mg total) by mouth once daily.    metformin (GLUCOPHAGE) 1000 MG tablet TAKE ONE TABLET BY MOUTH TWICE DAILY WITH MEALS    metformin (GLUCOPHAGE) 500 MG tablet Take 500 mg by mouth every evening.     metoprolol succinate (TOPROL-XL) 100 MG 24 hr tablet TAKE ONE TABLET BY MOUTH ONCE DAILY    multivitamin capsule Take 1 capsule by mouth once daily.    VITAMIN D2 50,000 unit capsule     aspirin 81 MG Chew Take 1 tablet (81 mg total) by mouth once daily.    hydrochlorothiazide (HYDRODIURIL) 12.5 MG Tab Take 1 tablet (12.5 mg total) by mouth once daily.    lancets (ONE TOUCH DELICA LANCETS) 33 gauge Misc 1 lancet by Misc.(Non-Drug; Combo Route) route 2 hours after meals and at bedtime.    ONETOUCH VERIO Strp CHECK BLOOD SUGAR BEFORE MEALS AND AT BEDTIME (FOUR TIMES A DAY)    pen needle, diabetic (BD ULTRA-FINE MADDIE PEN NEEDLES) 32 gauge x 5/32" Ndle USE AS DIRECTED x 4 a day    spironolactone (ALDACTONE) 25 MG tablet " "Take 1 tablet (25 mg total) by mouth once daily.           Clinical Reference Information           Your Vitals Were     BP Pulse Resp Height Weight SpO2    142/78 (BP Location: Right arm, Patient Position: Sitting, BP Method: Automatic) 84 18 5' 10" (1.778 m) 108.6 kg (239 lb 6.7 oz) 95%    BMI                34.35 kg/m2          Blood Pressure          Most Recent Value    BP  (!)  142/78      Allergies as of 5/12/2017     No Known Allergies      Immunizations Administered on Date of Encounter - 5/12/2017     None      Orders Placed During Today's Visit     Future Labs/Procedures Expected by Expires    Alpha 1 Antitrypsin Phenotype  5/12/2017 7/11/2018    ALIDA  5/12/2017 7/11/2018    Anti-smooth muscle antibody  5/12/2017 7/11/2018    Antimitochondrial antibody  5/12/2017 7/11/2018    Bilirubin, direct  5/12/2017 7/11/2018    Ceruloplasmin  5/12/2017 7/11/2018    Ferritin  5/12/2017 7/11/2018    Hepatitis A antibody, IgG  5/12/2017 7/11/2018    Hepatitis B core antibody, total  5/12/2017 5/12/2018    Hepatitis B surface antibody  5/12/2017 7/11/2018    Hepatitis C RNA, quantitative, PCR  5/12/2017 7/11/2018    IgG  5/12/2017 7/11/2018    Iron and TIBC  5/12/2017 7/11/2018    US Elastography Liver  5/12/2017 5/12/2018      Language Assistance Services     ATTENTION: Language assistance services are available, free of charge. Please call [phone].      ATENCIÓN: Si habla español, tiene a salvador disposición servicios gratuitos de asistencia lingüística. Llame al [phone].     MARISSA Ý: N?u b?n nói Ti?ng Vi?t, có các d?ch v? h? tr? ngôn ng? mi?n phí dành cho b?n. G?i s? [phone].         [facility] complies with applicable Federal civil rights laws and does not discriminate on the basis of race, color, national origin, age, disability, or sex.        "

## 2017-05-12 NOTE — LETTER
May 12, 2017      Elias Allen MD  [address] [facility] [address]           [facility]  [address]  [facility] [address]  Phone: [phone]  Fax: [phone]          Patient: Jorge Sharp   MR Number: [id number]   YOB: 1960   Date of Visit: 5/12/2017       Dear Dr. Elias Allen:    Thank you for referring Jorge Sharp to me for evaluation. Attached you will find relevant portions of my assessment and plan of care.    If you have questions, please do not hesitate to call me. I look forward to following Jorge Sharp along with you.    Sincerely,    Jenise Miller, ASHA    Enclosure  CC:  No Recipients    If you would like to receive this communication electronically, please contact [email or web address] or [phone] to request more information on [facility] Link access.    For providers and/or their staff who would like to refer a patient to [facility], please contact us through our one-stop-shop provider referral line, [facility], at [phone].    If you feel you have received this communication in error or would no longer like to receive these types of communications, please e-mail [email or web address]

## 2017-05-12 NOTE — PROGRESS NOTES
HEPATOLOGY CONSULTATION    Referring Physician: Elias Allen MD   Current Corresponding Physician: Elias Allen MD     Reason for Consultation: Consultation for evaluation of Hepatitis cholestatic and Fatty Liver    History of Present Illness: Jorge Sharp is a 57 y.o. malewho presents for evaluation of   Chief Complaint   Patient presents with    Hepatitis cholestatic    Fatty Liver     Mr. Sharp is a 58 yo male w/ hx of elevated LFTs noted for at least 10 years.  He has additional PMH HTN, HLD, DM, obesity.  He has however managed to lose ~30 # in the past year w/ decreasing is carbohydrate intake and increased exercise.  He also has hx of elevated TB w/ fractionated bilirubin primarily indirect. He recalls an episode of scleral icterus around the time of his bowel obstruction surgery but otherwise denies previously known liver disease or abnormal serologies  He is also noted to have HCV + antibody but with undetectable quant PCR  No symptoms of decompensation  Denies IVDU, intranasal drug use, tattoos, blood transfusions  Alcohol use, 1-2 beer per night x 1 year  Denies family hx of liver disease     Review of available records reveal:  Mildly elevated transaminases w/ improvement over the last year concomitant with weight loss pattern    (2012) ALIDA and SMA neg    (2014)  Hep A igm neg  Hep B c igm neg  Hep B s Ag neg  Hep C Ab +, Quant PCR undetectable    Us 4/17/17: Cholelithiasis without ultrasound findings of acute cholecystitis.  Bilateral renal cysts.  Hepatomegaly and diffuse fatty infiltration of the liver.    Other noted hx: HTN, DM, HLD, OA, BMI 34  Past Medical History:   Diagnosis Date    Arthritis     Diabetes mellitus     Diabetes mellitus type II     Hepatitis cholestatic     Hyperlipidemia     Hypertension     Incisional hernia      Outpatient Encounter Prescriptions as of 5/12/2017   Medication Sig Dispense Refill    amlodipine (NORVASC) 5 MG tablet Take 1 tablet (5 mg  "total) by mouth once daily. 90 tablet 1    cod liver oil Cap Take 1 capsule by mouth 2 (two) times daily.      doxazosin (CARDURA) 4 MG tablet TAKE ONE TABLET BY MOUTH ONCE DAILY IN THE EVENING 90 tablet 0    hydrALAZINE (APRESOLINE) 100 MG tablet Take 1 tablet (100 mg total) by mouth 2 (two) times daily. 180 tablet 3    insulin aspart (NOVOLOG FLEXPEN) 100 unit/mL InPn pen INJECT 10 TO 20 UNITS SUBCUTANEOUSLY BEFORE LUNCH AND DINNER 1 Box 11    insulin glargine (BASAGLAR KWIKPEN) 100 unit/mL (3 mL) InPn pen Inject 50 Units into the skin 2 (two) times daily. 90 mL 3    liraglutide 0.6 mg/0.1 mL, 18 mg/3 mL, subq PNIJ (VICTOZA 2-ROSARIO) 0.6 mg/0.1 mL (18 mg/3 mL) PnIj Inject 0.6 mg into the skin once daily. 3 mL 11    lisinopril (PRINIVIL,ZESTRIL) 40 MG tablet Take 1 tablet (40 mg total) by mouth once daily. 90 tablet 0    metformin (GLUCOPHAGE) 1000 MG tablet TAKE ONE TABLET BY MOUTH TWICE DAILY WITH MEALS 180 tablet 4    metformin (GLUCOPHAGE) 500 MG tablet Take 500 mg by mouth every evening.       metoprolol succinate (TOPROL-XL) 100 MG 24 hr tablet TAKE ONE TABLET BY MOUTH ONCE DAILY 90 tablet 4    multivitamin capsule Take 1 capsule by mouth once daily.      VITAMIN D2 50,000 unit capsule       aspirin 81 MG Chew Take 1 tablet (81 mg total) by mouth once daily. 50 tablet 3    hydrochlorothiazide (HYDRODIURIL) 12.5 MG Tab Take 1 tablet (12.5 mg total) by mouth once daily. 90 tablet 3    lancets (ONE TOUCH DELICA LANCETS) 33 gauge Misc 1 lancet by Misc.(Non-Drug; Combo Route) route 2 hours after meals and at bedtime. 300 each 11    ONETOUCH VERIO Strp CHECK BLOOD SUGAR BEFORE MEALS AND AT BEDTIME (FOUR TIMES A DAY) 300 strip 11    pen needle, diabetic (BD ULTRA-FINE MADDIE PEN NEEDLES) 32 gauge x 5/32" Ndle USE AS DIRECTED x 4 a day 300 each 11    spironolactone (ALDACTONE) 25 MG tablet Take 1 tablet (25 mg total) by mouth once daily. 90 tablet 3    [DISCONTINUED] COCONUT OIL ORAL Take by mouth.   "    [DISCONTINUED] omeprazole (PRILOSEC) 20 MG capsule Take 1 capsule (20 mg total) by mouth once daily. 30 capsule 11     No facility-administered encounter medications on file as of 5/12/2017.      Review of patient's allergies indicates:  No Known Allergies  Family History   Problem Relation Age of Onset    Hypertension Mother     Stroke Mother     Hypertension Father     Heart disease Father 67    Stroke Maternal Aunt     Cancer Maternal Aunt      lung    Cancer Maternal Uncle      liver    Glaucoma Maternal Uncle     Macular degeneration Neg Hx     Retinal detachment Neg Hx        Social History     Social History    Marital status:      Spouse name: N/A    Number of children: N/A    Years of education: N/A     Occupational History    Not on file.     Social History Main Topics    Smoking status: Never Smoker    Smokeless tobacco: Not on file    Alcohol use Yes      Comment:   1 six weekends    Drug use: No    Sexual activity: Yes     Partners: Female     Other Topics Concern    Not on file     Social History Narrative     Review of Systems   Constitutional: Negative for activity change, appetite change, chills, diaphoresis, fatigue, fever and unexpected weight change.   HENT: Negative for facial swelling.    Respiratory: Negative for cough, chest tightness and shortness of breath.    Cardiovascular: Negative for chest pain, palpitations and leg swelling.   Gastrointestinal: Negative for abdominal distention, abdominal pain, blood in stool, constipation, diarrhea, nausea and vomiting.   Musculoskeletal: Negative.  Negative for neck pain and neck stiffness.   Skin: Negative for color change, rash and wound.   Neurological: Negative for dizziness, tremors, weakness and light-headedness.   Hematological: Negative for adenopathy. Does not bruise/bleed easily.   Psychiatric/Behavioral: Negative for agitation and decreased concentration. The patient is not nervous/anxious.      Vitals:     05/12/17 1032   BP: (!) 142/78   Pulse: 84   Resp: 18       Physical Exam   Constitutional: He is oriented to person, place, and time. He appears well-developed and well-nourished. No distress.   HENT:   Head: Normocephalic and atraumatic.   Eyes: Conjunctivae are normal. Pupils are equal, round, and reactive to light. No scleral icterus.   Neck: Normal range of motion. Neck supple.   Cardiovascular: Normal rate.    Pulmonary/Chest: Effort normal.   Abdominal: Soft. He exhibits no distension and no mass. There is no tenderness. There is no rebound and no guarding.   Musculoskeletal: Normal range of motion.   Neurological: He is alert and oriented to person, place, and time. No cranial nerve deficit. He exhibits normal muscle tone. Coordination normal.   No asterixis   Skin: Skin is warm and dry. No rash noted. He is not diaphoretic. No erythema.   Psychiatric: He has a normal mood and affect. His behavior is normal. Judgment and thought content normal.       MELD-Na score: 11 at 3/17/2017  8:15 AM  MELD score: 11 at 3/17/2017  8:15 AM  Calculated from:  Serum Creatinine: 1.4 mg/dL at 3/17/2017  8:15 AM  Serum Sodium: 139 mmol/L (Rounded to 137) at 3/17/2017  8:15 AM  Total Bilirubin: 1.3 mg/dL at 3/17/2017  8:15 AM  INR(ratio): 1.0 at 3/17/2017  8:15 AM  Age: 56 years    Lab Results   Component Value Date     03/17/2017    BUN 19 03/17/2017    CREATININE 1.4 03/17/2017    CREATININE 1.1 08/22/2012    CALCIUM 10.2 03/17/2017    CALCIUM 9.7 08/22/2012     03/17/2017    K 4.1 03/17/2017     03/17/2017    PROT 7.7 03/17/2017    CO2 31 (H) 03/17/2017    ANIONGAP 7 (L) 03/17/2017    ANIONGAP 10 08/22/2012    WBC 5.84 03/17/2017    RBC 4.70 03/17/2017    HGB 14.6 03/17/2017    HCT 43.7 03/17/2017    MCV 93 03/17/2017    MCH 31.1 (H) 03/17/2017    MCHC 33.4 03/17/2017     Lab Results   Component Value Date    RDW 13.7 03/17/2017     03/17/2017    MPV 11.2 03/17/2017    GRAN 3.8 03/17/2017    GRAN  64.2 03/17/2017    LYMPH 1.6 03/17/2017    LYMPH 27.6 03/17/2017    MONO 0.4 03/17/2017    MONO 7.2 03/17/2017    EOSINOPHIL 0.5 03/17/2017    BASOPHIL 0.2 03/17/2017    EOS 0.0 03/17/2017    BASO 0.01 03/17/2017    ALIDA NEG 1:40 08/22/2012    CHOL 194 03/17/2017    TRIG 149 03/17/2017    HDL 35 (L) 03/17/2017    CHOLHDL 18.0 (L) 03/17/2017    TOTALCHOLEST 5.5 (H) 03/17/2017    ALBUMIN 3.9 03/17/2017    BILIDIR 0.6 (H) 11/09/2013    AST 37 03/17/2017    AST 57 (H) 08/22/2012    ALT 49 (H) 03/17/2017    ALKPHOS 43 (L) 03/17/2017    ALKPHOS 76 08/22/2012    MG 2.1 05/13/2016    LABPROT 10.6 03/17/2017    INR 1.0 03/17/2017    INR 1.06 08/22/2012    PSA 0.15 06/29/2012       Assessment and Plan:  Patient seen in collaboration with Dr. Fishman  NAFLD: w/o evidence of advanced liver disease  -discussed continued diet and exercise for the goal of gradual weight loss  -advised to decrease alcohol intake  -recommend optimal management of HTN, HLD, DM  -liver w/u serologies to r/o any additional underlying liver disease  -fibroscan today  -will get direct bilirubin to verify Gilbert's  -recommend Hep A/B vaccine, pending labs  Hepatitis C Ab +: per Dr. Fishman recommendations, will check quant PCR q 3-4 months x 1 year  EDUCATION:   -We discussed the manifestations of NAFLD. At this time there is no FDA approved therapy for NAFLD.   -The patient and I discussed the importance of diet, exercise, and weight loss for management of NAFLD. We discussed a low fat, low carb/low sugar, high fiber diet, and a goal of 30 minutes of exercise 5 times per week.   -optimal medical management of HTN, HLD, DM which is known to be risk factors for fatty liver  -Pt is aware that fatty liver can progress to steatohepatitis and possibly to cirrhosis, putting one at increased risk for liver cancer, liver failure, and death.       Total duration of visit = 40 min, with > 50% spent counseling  RTC 12-16 weeks    Thank you for allowing me to  participate in this patient's care.   Patient Active Problem List   Diagnosis    DM II (diabetes mellitus, type II), controlled    HTN (hypertension), 2008    Hepatitis cholestatic    Abnormal cardiovascular stress test    Ventral hernia, repaired 12/12/2013    Incisional hernia, repaired 12/12/2013    STILES (dyspnea on exertion)    Severe obesity (BMI 35.0-39.9) with comorbidity    Peripheral edema    Fatigue    OA (osteoarthritis)    LVH (left ventricular hypertrophy)    Stress at home and work    Sleep deprivation    ED (erectile dysfunction)    Sedentary lifestyle    Type 2 diabetes mellitus with hyperglycemia    Essential hypertension    Hypertriglyceridemia    Fatty infiltration of liver    Hypovitaminosis D     Jorge Sharp is a 57 y.o. male withHepatitis cholestatic and Fatty Liver

## 2017-05-12 NOTE — PROGRESS NOTES
I have personally performed a face to face diagnostic evaluation on this patient. I have reviewed and agree with today's findings and the care plan outlined by Jenise Miller NP      My findings are as follows:  Patient presents with elevated liver tests and fatty liver on imaging. HCV Ab positive but RNA quant is negative. Drinks 1-2 beers per night. Liver enzymes have improved since he lost 40 lbs. Had lower plts which now have improved. Complete serologic w/u for elevated liver tests, do fibroscan, check HCV RNA periodically to rule out low level intermittent viremia, avoid alcohol.      he will return to Jenise Miller NP  for follow-up.

## 2017-05-15 LAB
A1AT PHENOTYP SERPL-IMP: NORMAL BANDS
A1AT SERPL NEPH-MCNC: 127 MG/DL
ANA SER QL IF: NORMAL
HCV LOG: <1.08 LOG (10) IU/ML
HCV RNA QUANT PCR: <12 IU/ML
HCV, QUALITATIVE: NOT DETECTED IU/ML
MITOCHONDRIA AB TITR SER IF: NORMAL {TITER}

## 2017-05-16 LAB — SMOOTH MUSCLE AB TITR SER IF: ABNORMAL {TITER}

## 2017-05-24 RX ORDER — DOXAZOSIN 4 MG/1
4 TABLET ORAL DAILY
Qty: 90 TABLET | Refills: 3 | Status: SHIPPED | OUTPATIENT
Start: 2017-05-24 | End: 2018-02-27 | Stop reason: SDUPTHER

## 2017-06-01 DIAGNOSIS — I10 ESSENTIAL HYPERTENSION: Chronic | ICD-10-CM

## 2017-06-01 RX ORDER — BLOOD-GLUCOSE METER
EACH MISCELLANEOUS
Qty: 300 STRIP | Refills: 0 | Status: SHIPPED | OUTPATIENT
Start: 2017-06-01 | End: 2017-08-14 | Stop reason: SDUPTHER

## 2017-06-01 RX ORDER — LISINOPRIL 40 MG/1
TABLET ORAL
Qty: 90 TABLET | Refills: 3 | Status: SHIPPED | OUTPATIENT
Start: 2017-06-01 | End: 2021-01-19 | Stop reason: SDUPTHER

## 2017-06-01 RX ORDER — HYDRALAZINE HYDROCHLORIDE 100 MG/1
TABLET, FILM COATED ORAL
Qty: 180 TABLET | Refills: 0 | Status: SHIPPED | OUTPATIENT
Start: 2017-06-01 | End: 2017-08-13 | Stop reason: SDUPTHER

## 2017-07-29 ENCOUNTER — LAB VISIT (OUTPATIENT)
Dept: LAB | Facility: HOSPITAL | Age: 57
End: 2017-07-29
Attending: FAMILY MEDICINE
Payer: COMMERCIAL

## 2017-07-29 DIAGNOSIS — Z79.4 CONTROLLED TYPE 2 DIABETES MELLITUS WITH COMPLICATION, WITH LONG-TERM CURRENT USE OF INSULIN: ICD-10-CM

## 2017-07-29 DIAGNOSIS — E11.8 CONTROLLED TYPE 2 DIABETES MELLITUS WITH COMPLICATION, WITH LONG-TERM CURRENT USE OF INSULIN: ICD-10-CM

## 2017-07-29 DIAGNOSIS — I10 ESSENTIAL HYPERTENSION: ICD-10-CM

## 2017-07-29 LAB
ALBUMIN SERPL BCP-MCNC: 3.5 G/DL
ALP SERPL-CCNC: 36 U/L
ALT SERPL W/O P-5'-P-CCNC: 40 U/L
ANION GAP SERPL CALC-SCNC: 10 MMOL/L
AST SERPL-CCNC: 36 U/L
BASOPHILS # BLD AUTO: 0.01 K/UL
BASOPHILS NFR BLD: 0.2 %
BILIRUB SERPL-MCNC: 1.5 MG/DL
BUN SERPL-MCNC: 19 MG/DL
CALCIUM SERPL-MCNC: 9.2 MG/DL
CHLORIDE SERPL-SCNC: 102 MMOL/L
CHOLEST/HDLC SERPL: 5.2 {RATIO}
CO2 SERPL-SCNC: 26 MMOL/L
CREAT SERPL-MCNC: 1.4 MG/DL
DIFFERENTIAL METHOD: ABNORMAL
EOSINOPHIL # BLD AUTO: 0.1 K/UL
EOSINOPHIL NFR BLD: 1.1 %
ERYTHROCYTE [DISTWIDTH] IN BLOOD BY AUTOMATED COUNT: 13.4 %
EST. GFR  (AFRICAN AMERICAN): >60 ML/MIN/1.73 M^2
EST. GFR  (NON AFRICAN AMERICAN): 55.8 ML/MIN/1.73 M^2
ESTIMATED AVG GLUCOSE: 123 MG/DL
GLUCOSE SERPL-MCNC: 115 MG/DL
HBA1C MFR BLD HPLC: 5.9 %
HCT VFR BLD AUTO: 38.7 %
HDL/CHOLESTEROL RATIO: 19.2 %
HDLC SERPL-MCNC: 177 MG/DL
HDLC SERPL-MCNC: 34 MG/DL
HGB BLD-MCNC: 13.1 G/DL
LDLC SERPL CALC-MCNC: 111.6 MG/DL
LYMPHOCYTES # BLD AUTO: 1.5 K/UL
LYMPHOCYTES NFR BLD: 28.4 %
MCH RBC QN AUTO: 31.1 PG
MCHC RBC AUTO-ENTMCNC: 33.9 G/DL
MCV RBC AUTO: 92 FL
MONOCYTES # BLD AUTO: 0.6 K/UL
MONOCYTES NFR BLD: 10.6 %
NEUTROPHILS # BLD AUTO: 3.2 K/UL
NEUTROPHILS NFR BLD: 59.3 %
NONHDLC SERPL-MCNC: 143 MG/DL
PLATELET # BLD AUTO: 162 K/UL
PMV BLD AUTO: 10.9 FL
POTASSIUM SERPL-SCNC: 4 MMOL/L
PROT SERPL-MCNC: 6.9 G/DL
RBC # BLD AUTO: 4.21 M/UL
SODIUM SERPL-SCNC: 138 MMOL/L
TRIGL SERPL-MCNC: 157 MG/DL
WBC # BLD AUTO: 5.36 K/UL

## 2017-07-29 PROCEDURE — 36415 COLL VENOUS BLD VENIPUNCTURE: CPT | Mod: PO

## 2017-07-29 PROCEDURE — 85025 COMPLETE CBC W/AUTO DIFF WBC: CPT

## 2017-07-29 PROCEDURE — 80053 COMPREHEN METABOLIC PANEL: CPT

## 2017-07-29 PROCEDURE — 83036 HEMOGLOBIN GLYCOSYLATED A1C: CPT

## 2017-07-29 PROCEDURE — 80061 LIPID PANEL: CPT

## 2017-08-03 ENCOUNTER — DOCUMENTATION ONLY (OUTPATIENT)
Dept: FAMILY MEDICINE | Facility: CLINIC | Age: 57
End: 2017-08-03

## 2017-08-03 NOTE — PROGRESS NOTES
Pre-Visit Chart Review  For Appointment Scheduled on 08/04/2017    Health Maintenance Due   Topic Date Due    Influenza Vaccine  08/01/2017

## 2017-08-04 ENCOUNTER — OFFICE VISIT (OUTPATIENT)
Dept: FAMILY MEDICINE | Facility: CLINIC | Age: 57
End: 2017-08-04
Payer: COMMERCIAL

## 2017-08-04 VITALS
HEIGHT: 70 IN | DIASTOLIC BLOOD PRESSURE: 78 MMHG | SYSTOLIC BLOOD PRESSURE: 125 MMHG | WEIGHT: 240.31 LBS | BODY MASS INDEX: 34.4 KG/M2 | HEART RATE: 67 BPM | TEMPERATURE: 98 F

## 2017-08-04 DIAGNOSIS — I10 ESSENTIAL HYPERTENSION: Primary | Chronic | ICD-10-CM

## 2017-08-04 DIAGNOSIS — Z79.4 CONTROLLED TYPE 2 DIABETES MELLITUS WITH COMPLICATION, WITH LONG-TERM CURRENT USE OF INSULIN: ICD-10-CM

## 2017-08-04 DIAGNOSIS — M72.2 PLANTAR FASCIITIS, LEFT: Chronic | ICD-10-CM

## 2017-08-04 DIAGNOSIS — Z23 NEED FOR HEPATITIS B VACCINATION: ICD-10-CM

## 2017-08-04 DIAGNOSIS — E66.9 DIABETES MELLITUS TYPE 2 IN OBESE: ICD-10-CM

## 2017-08-04 DIAGNOSIS — E11.69 DIABETES MELLITUS TYPE 2 IN OBESE: ICD-10-CM

## 2017-08-04 DIAGNOSIS — R35.1 NOCTURIA: ICD-10-CM

## 2017-08-04 DIAGNOSIS — E11.8 CONTROLLED TYPE 2 DIABETES MELLITUS WITH COMPLICATION, WITH LONG-TERM CURRENT USE OF INSULIN: ICD-10-CM

## 2017-08-04 DIAGNOSIS — E66.01 SEVERE OBESITY (BMI 35.0-39.9) WITH COMORBIDITY: ICD-10-CM

## 2017-08-04 PROCEDURE — 90471 IMMUNIZATION ADMIN: CPT | Mod: S$GLB,,, | Performed by: FAMILY MEDICINE

## 2017-08-04 PROCEDURE — 3074F SYST BP LT 130 MM HG: CPT | Mod: S$GLB,,, | Performed by: FAMILY MEDICINE

## 2017-08-04 PROCEDURE — 3078F DIAST BP <80 MM HG: CPT | Mod: S$GLB,,, | Performed by: FAMILY MEDICINE

## 2017-08-04 PROCEDURE — 4010F ACE/ARB THERAPY RXD/TAKEN: CPT | Mod: S$GLB,,, | Performed by: FAMILY MEDICINE

## 2017-08-04 PROCEDURE — 3008F BODY MASS INDEX DOCD: CPT | Mod: S$GLB,,, | Performed by: FAMILY MEDICINE

## 2017-08-04 PROCEDURE — 99214 OFFICE O/P EST MOD 30 MIN: CPT | Mod: 25,S$GLB,, | Performed by: FAMILY MEDICINE

## 2017-08-04 PROCEDURE — 99999 PR PBB SHADOW E&M-EST. PATIENT-LVL III: CPT | Mod: PBBFAC,,, | Performed by: FAMILY MEDICINE

## 2017-08-04 PROCEDURE — 3044F HG A1C LEVEL LT 7.0%: CPT | Mod: S$GLB,,, | Performed by: FAMILY MEDICINE

## 2017-08-04 PROCEDURE — 90746 HEPB VACCINE 3 DOSE ADULT IM: CPT | Mod: S$GLB,,, | Performed by: FAMILY MEDICINE

## 2017-08-04 RX ORDER — METFORMIN HYDROCHLORIDE 1000 MG/1
1000 TABLET ORAL 2 TIMES DAILY WITH MEALS
Qty: 180 TABLET | Refills: 4
Start: 2017-08-04 | End: 2018-03-01 | Stop reason: SDUPTHER

## 2017-08-04 RX ORDER — INSULIN GLARGINE 100 [IU]/ML
INJECTION, SOLUTION SUBCUTANEOUS
COMMUNITY
End: 2017-12-22 | Stop reason: SDUPTHER

## 2017-08-04 RX ORDER — METOPROLOL SUCCINATE 100 MG/1
100 TABLET, EXTENDED RELEASE ORAL DAILY
Qty: 90 TABLET | Refills: 4 | Status: SHIPPED | OUTPATIENT
Start: 2017-08-04 | End: 2018-07-20 | Stop reason: SDUPTHER

## 2017-08-04 NOTE — PATIENT INSTRUCTIONS
4 Steps for Eating Healthier  Changing the way you eat can improve your health. It can lower your cholesterol and blood pressure, and help you stay at a healthy weight. Your diet doesnt have to be bland and boring to be healthy. Just watch your calories and follow these steps:    1. Eat fewer unhealthy fats  · Choose more fish and lean meats instead of fatty cuts of meat.  · Skip butter and lard, and use less margarine.  · Pass on foods that have palm, coconut, or hydrogenated oils.  · Eat fewer high-fat dairy foods like cheese, ice cream, and whole milk.  · Get a heart-healthy cookbook and try some low-fat recipes.  2. Go light on salt  · Keep the saltshaker off the table.  · Limit high-salt ingredients, such as soy sauce, bouillon, and garlic salt.  · Instead of adding salt when cooking, season your food with herbs and flavorings. Try lemon, garlic, and onion.  · Limit convenience foods, such as boxed or canned foods and restaurant food.  · Read food labels and choose lower-sodium options.  3. Limit sugar  · Pause before you add sugars to pancakes, cereal, coffee, or tea. This includes white and brown table sugar, syrup, honey, and molasses. Cut your usual amount by half.  · Use non-sugar sweeteners. Stevia, aspartame, and sucralose can satisfy a sweet tooth without adding calories.  · Swap out sugar-filled soda and other drinks. Buy sugar-free or low-calorie beverages. Remember water is always the best choice.  · Read labels and choose foods with less added sugar. Keep in mind that dairy foods and foods with fruit will have some natural sugar.  · Cut the sugar in recipes by 1/3 to 1/2. Boost the flavor with extracts like almond, vanilla, or orange. Or add spices such as cinnamon or nutmeg.  4. Eat more fiber  · Eat fresh fruits and vegetables every day.  · Boost your diet with whole grains. Go for oats, whole-grain rice, and bran.  · Add beans and lentils to your meals.  · Drink more water to match your fiber  increase. This is to help prevent constipation.  Date Last Reviewed: 5/11/2015  © 8200-2603 [employer]. [address] [place] [address], [place] [address]. All rights reserved. This information is not intended as a substitute for professional medical care. Always follow your healthcare professional's instructions.

## 2017-08-04 NOTE — PROGRESS NOTES
2 patient identifiers used (name and ). Administered Hep B vaccine IM. Patient tolerated well, no bleeding at insertion site noted. Pain scale 1/10. Aseptic technique maintained. Immunization information given to patient.

## 2017-08-09 ENCOUNTER — PATIENT MESSAGE (OUTPATIENT)
Dept: ADMINISTRATIVE | Facility: OTHER | Age: 57
End: 2017-08-09

## 2017-08-13 DIAGNOSIS — I10 ESSENTIAL HYPERTENSION: Chronic | ICD-10-CM

## 2017-08-14 RX ORDER — AMLODIPINE BESYLATE 5 MG/1
5 TABLET ORAL DAILY
Qty: 90 TABLET | Refills: 3 | Status: SHIPPED | OUTPATIENT
Start: 2017-08-14 | End: 2018-07-20 | Stop reason: SDUPTHER

## 2017-08-14 RX ORDER — HYDRALAZINE HYDROCHLORIDE 100 MG/1
TABLET, FILM COATED ORAL
Qty: 180 TABLET | Refills: 3 | Status: SHIPPED | OUTPATIENT
Start: 2017-08-14 | End: 2018-07-20 | Stop reason: SDUPTHER

## 2017-08-14 RX ORDER — AMLODIPINE BESYLATE 5 MG/1
TABLET ORAL
Qty: 90 TABLET | Refills: 1 | Status: SHIPPED | OUTPATIENT
Start: 2017-08-14 | End: 2017-08-14 | Stop reason: SDUPTHER

## 2017-08-15 NOTE — PROGRESS NOTES
Subjective:       Patient ID: Jorge Sharp is a 57 y.o. male.    Chief Complaint: Hypertension; Diabetes; and Foot Pain    Hypertension   This is a chronic problem. The current episode started more than 1 year ago. The problem is controlled. Associated symptoms include anxiety. Pertinent negatives include no blurred vision, chest pain, headaches, malaise/fatigue, neck pain, orthopnea, peripheral edema or shortness of breath. Risk factors for coronary artery disease include sedentary lifestyle and post-menopausal state. Compliance problems include exercise.    Diabetes   Hypoglycemia symptoms include dizziness. Pertinent negatives for hypoglycemia include no headaches. Pertinent negatives for diabetes include no blurred vision, no chest pain and no fatigue.   Foot Pain   Pertinent negatives include no chest pain, chills, coughing, fatigue, fever, headaches, neck pain or sore throat.     Review of Systems   Constitutional: Negative for chills, fatigue, fever and malaise/fatigue.   HENT: Positive for postnasal drip and rhinorrhea. Negative for ear discharge, ear pain, sinus pressure, sneezing and sore throat.    Eyes: Negative for blurred vision.   Respiratory: Negative for cough, shortness of breath and wheezing.    Cardiovascular: Negative for chest pain and orthopnea.   Musculoskeletal: Negative for neck pain.   Neurological: Positive for dizziness. Negative for headaches.       Patient Active Problem List   Diagnosis    DM II (diabetes mellitus, type II), controlled    HTN (hypertension), 2008    Hepatitis cholestatic    Abnormal cardiovascular stress test    Ventral hernia, repaired 12/12/2013    Incisional hernia, repaired 12/12/2013    STILES (dyspnea on exertion)    Severe obesity (BMI 35.0-39.9) with comorbidity    Peripheral edema    Fatigue    OA (osteoarthritis)    LVH (left ventricular hypertrophy)    Stress at home and work    Sleep deprivation    ED (erectile dysfunction)     Sedentary lifestyle    Type 2 diabetes mellitus with hyperglycemia    Essential hypertension    Hypertriglyceridemia    Fatty infiltration of liver    Hypovitaminosis D    Plantar fasciitis, left       Objective:      Physical Exam   Constitutional: He is oriented to person, place, and time. He appears well-developed and well-nourished.   Cardiovascular: Normal rate, regular rhythm and normal heart sounds.    Pulses:       Dorsalis pedis pulses are 3+ on the right side, and 3+ on the left side.        Posterior tibial pulses are 3+ on the right side, and 3+ on the left side.   Pulmonary/Chest: Effort normal and breath sounds normal.   Musculoskeletal: He exhibits no edema.        Right foot: There is normal range of motion and no deformity.   Feet:   Right Foot:   Protective Sensation: 6 sites tested. 6 sites sensed.   Skin Integrity: Negative for ulcer.   Left Foot:   Protective Sensation: 6 sites tested. 6 sites sensed.   Skin Integrity: Negative for ulcer or blister.   Neurological: He is alert and oriented to person, place, and time.   Skin: Skin is warm and dry.   Psychiatric: He has a normal mood and affect.   Nursing note and vitals reviewed.      Lab Results   Component Value Date    WBC 5.36 07/29/2017    HGB 13.1 (L) 07/29/2017    HCT 38.7 (L) 07/29/2017     07/29/2017    CHOL 177 07/29/2017    TRIG 157 (H) 07/29/2017    HDL 34 (L) 07/29/2017    ALT 40 07/29/2017    AST 36 07/29/2017     07/29/2017    K 4.0 07/29/2017     07/29/2017    CREATININE 1.4 07/29/2017    BUN 19 07/29/2017    CO2 26 07/29/2017    TSH 1.798 09/16/2016    PSA 0.15 06/29/2012    INR 1.0 03/17/2017    HGBA1C 5.9 (H) 07/29/2017     The 10-year ASCVD risk score (Geetha JL Jr., et al., 2013) is: 15.4%    Values used to calculate the score:      Age: 57 years      Sex: Male      Is Non- : No      Diabetic: Yes      Tobacco smoker: No      Systolic Blood Pressure: 125 mmHg      Is BP treated:  Yes      HDL Cholesterol: 34 mg/dL      Total Cholesterol: 177 mg/dL    Assessment:       1. Essential hypertension    2. Controlled type 2 diabetes mellitus with complication, with long-term current use of insulin    3. Diabetes mellitus type 2 in obese    4. Plantar fasciitis, left    5. Need for hepatitis B vaccination    6. Severe obesity (BMI 35.0-39.9) with comorbidity    7. Nocturia        Plan:       Essential hypertension  -     metoprolol succinate (TOPROL-XL) 100 MG 24 hr tablet; Take 1 tablet (100 mg total) by mouth once daily.  Dispense: 90 tablet; Refill: 4    Controlled type 2 diabetes mellitus with complication, with long-term current use of insulin    Diabetes mellitus type 2 in obese  -     metformin (GLUCOPHAGE) 1000 MG tablet; Take 1 tablet (1,000 mg total) by mouth 2 (two) times daily with meals.  Dispense: 180 tablet; Refill: 4  -     metoprolol succinate (TOPROL-XL) 100 MG 24 hr tablet; Take 1 tablet (100 mg total) by mouth once daily.  Dispense: 90 tablet; Refill: 4  -     Comprehensive metabolic panel; Future; Expected date: 08/04/2017  -     Hemoglobin A1c; Future; Expected date: 08/04/2017  -     Magnesium; Future; Expected date: 08/04/2017    Plantar fasciitis, left    Need for hepatitis B vaccination  -     (In Office Administered) Hepatitis B Vaccine (Adult) (IM)  -     Hepatitis B surface antibody; Future; Expected date: 08/04/2017  -     Hepatitis B core antibody, total; Future; Expected date: 08/04/2017    Severe obesity (BMI 35.0-39.9) with comorbidity    Nocturia  -     PROSTATE SPECIFIC ANTIGEN, DIAGNOSTIC; Future; Expected date: 08/04/2017      Patient readiness: acceptance and barriers:readiness    During the course of the visit the patient was educated and counseled about the following:     Diabetes:  Suggested low cholesterol diet.  Encouraged aerobic exercise.  Reminded to get yearly retinal exam.  Discussed ways to avoid symptomatic hypoglycemia.  Hypertension:   Dietary  sodium restriction.  Regular aerobic exercise.  Check blood pressures daily and record.    Goals: Diabetes: Maintain Hemoglobin A1C below 7, Hypertension: Reduce Blood Pressure and Obesity: Reduce calorie intake and BMI    Did patient meet goals/outcomes: Yes    The following self management tools provided: blood pressure log  excercise log    Patient Instructions (the written plan) was given to the patient/family.     Time spent with patient: 30 minutes          30-minute visit. 20 minutes spent counseling patient on diet, exercise, and weight loss.  This has been fully explained to the patient, who indicates understanding.

## 2017-08-18 ENCOUNTER — OFFICE VISIT (OUTPATIENT)
Dept: HEPATOLOGY | Facility: CLINIC | Age: 57
End: 2017-08-18
Payer: COMMERCIAL

## 2017-08-18 VITALS
HEIGHT: 70 IN | WEIGHT: 236.31 LBS | BODY MASS INDEX: 33.83 KG/M2 | SYSTOLIC BLOOD PRESSURE: 157 MMHG | HEART RATE: 70 BPM | TEMPERATURE: 98 F | OXYGEN SATURATION: 98 % | RESPIRATION RATE: 16 BRPM | DIASTOLIC BLOOD PRESSURE: 81 MMHG

## 2017-08-18 DIAGNOSIS — I10 ESSENTIAL HYPERTENSION: ICD-10-CM

## 2017-08-18 DIAGNOSIS — E78.1 HYPERTRIGLYCERIDEMIA: ICD-10-CM

## 2017-08-18 DIAGNOSIS — K76.0 NAFLD (NONALCOHOLIC FATTY LIVER DISEASE): Primary | ICD-10-CM

## 2017-08-18 DIAGNOSIS — E11.65 TYPE 2 DIABETES MELLITUS WITH HYPERGLYCEMIA, UNSPECIFIED LONG TERM INSULIN USE STATUS: ICD-10-CM

## 2017-08-18 PROCEDURE — 4010F ACE/ARB THERAPY RXD/TAKEN: CPT | Mod: S$GLB,,, | Performed by: NURSE PRACTITIONER

## 2017-08-18 PROCEDURE — 99999 PR PBB SHADOW E&M-EST. PATIENT-LVL IV: CPT | Mod: PBBFAC,,, | Performed by: NURSE PRACTITIONER

## 2017-08-18 PROCEDURE — 3044F HG A1C LEVEL LT 7.0%: CPT | Mod: S$GLB,,, | Performed by: NURSE PRACTITIONER

## 2017-08-18 PROCEDURE — 3077F SYST BP >= 140 MM HG: CPT | Mod: S$GLB,,, | Performed by: NURSE PRACTITIONER

## 2017-08-18 PROCEDURE — 3079F DIAST BP 80-89 MM HG: CPT | Mod: S$GLB,,, | Performed by: NURSE PRACTITIONER

## 2017-08-18 PROCEDURE — 3008F BODY MASS INDEX DOCD: CPT | Mod: S$GLB,,, | Performed by: NURSE PRACTITIONER

## 2017-08-18 PROCEDURE — 99212 OFFICE O/P EST SF 10 MIN: CPT | Mod: S$GLB,,, | Performed by: NURSE PRACTITIONER

## 2017-08-18 RX ORDER — SPIRONOLACTONE 25 MG/1
25 TABLET ORAL DAILY
COMMUNITY
Start: 2017-08-13 | End: 2018-02-28 | Stop reason: SDUPTHER

## 2017-08-18 NOTE — PROGRESS NOTES
HEPATOLOGY FOLLOW UP    Referring Physician: Elias Allen MD   Current Corresponding Physician: Elias Allen MD     Reason for Consultation: Consultation for evaluation of Fatty Liver    History of Present Illness: Jorge Sharp is a 57 y.o. malewho presents for evaluation of   Chief Complaint   Patient presents with    Fatty Liver     Mr. Sharp is a 56 yo male her for f/u NAFLD w/ mildly elevated LFTs.  With close to 30 # weight loss his LFTs have shown a gradual improvement.  He also has hx of elevated TB w/ fractionated bilirubin primarily indirect, likely Gilbert's  Hep C Ab + with undetectable virus, treatment naive and no know risk factors    Liver w/u serologies negative for A1AT, Max's, HH, AIH  Negative for hepatitis A and B  fibroscan 5/12/17, F2, Kpa 9.7    Past Medical History:   Diagnosis Date    Arthritis     Diabetes mellitus     Diabetes mellitus type II     Hepatitis cholestatic     Hyperlipidemia     Hypertension     Incisional hernia      Outpatient Encounter Prescriptions as of 8/18/2017   Medication Sig Dispense Refill    amlodipine (NORVASC) 5 MG tablet Take 1 tablet (5 mg total) by mouth once daily. 90 tablet 3    cod liver oil Cap Take 1 capsule by mouth 2 (two) times daily.      doxazosin (CARDURA) 4 MG tablet Take 1 tablet (4 mg total) by mouth once daily. 90 tablet 3    hydrALAZINE (APRESOLINE) 100 MG tablet TAKE ONE TABLET BY MOUTH TWICE DAILY 180 tablet 3    insulin aspart (NOVOLOG FLEXPEN) 100 unit/mL InPn pen INJECT 10 TO 20 UNITS SUBCUTANEOUSLY BEFORE LUNCH AND DINNER 1 Box 11    insulin glargine (BASAGLAR KWIKPEN) 100 unit/mL (3 mL) InPn pen Inject into the skin.      liraglutide 0.6 mg/0.1 mL, 18 mg/3 mL, subq PNIJ (VICTOZA 2-ROSARIO) 0.6 mg/0.1 mL (18 mg/3 mL) PnIj Inject 0.6 mg into the skin once daily. 3 mL 11    lisinopril (PRINIVIL,ZESTRIL) 40 MG tablet TAKE ONE TABLET BY MOUTH ONCE DAILY 90 tablet 3    metformin (GLUCOPHAGE) 1000 MG tablet Take 1  "tablet (1,000 mg total) by mouth 2 (two) times daily with meals. 180 tablet 4    metoprolol succinate (TOPROL-XL) 100 MG 24 hr tablet Take 1 tablet (100 mg total) by mouth once daily. 90 tablet 4    multivitamin capsule Take 1 capsule by mouth once daily.      spironolactone (ALDACTONE) 25 MG tablet Take 25 mg by mouth once daily.       aspirin 81 MG Chew Take 1 tablet (81 mg total) by mouth once daily. 50 tablet 3    blood sugar diagnostic (ONETOUCH VERIO) Strp Inject 1 strip into the skin 4 (four) times daily. 300 strip 0    hydrochlorothiazide (HYDRODIURIL) 12.5 MG Tab Take 1 tablet (12.5 mg total) by mouth once daily. 90 tablet 3    lancets (ONE TOUCH DELICA LANCETS) 33 gauge Misc 1 lancet by Misc.(Non-Drug; Combo Route) route 2 hours after meals and at bedtime. 300 each 11    pen needle, diabetic (BD ULTRA-FINE MADDIE PEN NEEDLES) 32 gauge x 5/32" Ndle USE AS DIRECTED x 4 a day 300 each 11     No facility-administered encounter medications on file as of 8/18/2017.      Review of patient's allergies indicates:  No Known Allergies  Family History   Problem Relation Age of Onset    Hypertension Mother     Stroke Mother     Hypertension Father     Heart disease Father 67    Stroke Maternal Aunt     Cancer Maternal Aunt      lung    Cancer Maternal Uncle      liver    Glaucoma Maternal Uncle     Macular degeneration Neg Hx     Retinal detachment Neg Hx        Social History     Social History    Marital status:      Spouse name: N/A    Number of children: N/A    Years of education: N/A     Occupational History    Not on file.     Social History Main Topics    Smoking status: Never Smoker    Smokeless tobacco: Not on file    Alcohol use Yes      Comment:   1 six weekends    Drug use: No    Sexual activity: Yes     Partners: Female     Other Topics Concern    Not on file     Social History Narrative    No narrative on file     Review of Systems   Constitutional: Negative for activity " change, appetite change, chills, diaphoresis, fatigue, fever and unexpected weight change.   HENT: Negative for facial swelling.    Respiratory: Negative for cough, chest tightness and shortness of breath.    Cardiovascular: Negative for chest pain, palpitations and leg swelling.   Gastrointestinal: Negative for abdominal distention, abdominal pain, blood in stool, constipation, diarrhea, nausea and vomiting.   Musculoskeletal: Negative.  Negative for neck pain and neck stiffness.   Skin: Negative for color change, rash and wound.   Neurological: Negative for dizziness, tremors, weakness and light-headedness.   Hematological: Negative for adenopathy. Does not bruise/bleed easily.   Psychiatric/Behavioral: Negative for agitation and decreased concentration. The patient is not nervous/anxious.      Vitals:    08/18/17 0924   BP: (!) 157/81   Pulse: 70   Resp: 16   Temp: 97.7 °F (36.5 °C)       Physical Exam   Constitutional: He is oriented to person, place, and time. He appears well-developed and well-nourished. No distress.   HENT:   Head: Normocephalic and atraumatic.   Eyes: Conjunctivae are normal. No scleral icterus.   Neck: Normal range of motion. Neck supple.   Cardiovascular: Normal rate.    Pulmonary/Chest: Effort normal.   Abdominal: He exhibits no distension.   Musculoskeletal: Normal range of motion.   Neurological: He is alert and oriented to person, place, and time. Coordination normal.   No asterixis   Skin: Skin is dry. No rash noted. He is not diaphoretic. No erythema.   Psychiatric: He has a normal mood and affect. His behavior is normal. Judgment and thought content normal.       MELD-Na score: 11 at 3/17/2017  8:15 AM  MELD score: 11 at 3/17/2017  8:15 AM  Calculated from:  Serum Creatinine: 1.4 mg/dL at 3/17/2017  8:15 AM  Serum Sodium: 139 mmol/L (Rounded to 137) at 3/17/2017  8:15 AM  Total Bilirubin: 1.3 mg/dL at 3/17/2017  8:15 AM  INR(ratio): 1.0 at 3/17/2017  8:15 AM  Age: 56 years    Lab  Results   Component Value Date     (H) 07/29/2017    BUN 19 07/29/2017    CREATININE 1.4 07/29/2017    CREATININE 1.1 08/22/2012    CALCIUM 9.2 07/29/2017    CALCIUM 9.7 08/22/2012     07/29/2017    K 4.0 07/29/2017     07/29/2017    PROT 6.9 07/29/2017    CO2 26 07/29/2017    ANIONGAP 10 07/29/2017    ANIONGAP 10 08/22/2012    WBC 5.36 07/29/2017    RBC 4.21 (L) 07/29/2017    HGB 13.1 (L) 07/29/2017    HCT 38.7 (L) 07/29/2017    MCV 92 07/29/2017    MCH 31.1 (H) 07/29/2017    MCHC 33.9 07/29/2017     Lab Results   Component Value Date    RDW 13.4 07/29/2017     07/29/2017    MPV 10.9 07/29/2017    GRAN 3.2 07/29/2017    GRAN 59.3 07/29/2017    LYMPH 1.5 07/29/2017    LYMPH 28.4 07/29/2017    MONO 0.6 07/29/2017    MONO 10.6 07/29/2017    EOSINOPHIL 1.1 07/29/2017    BASOPHIL 0.2 07/29/2017    EOS 0.1 07/29/2017    BASO 0.01 07/29/2017    ALIDA NEG 1:40 08/22/2012    CHOL 177 07/29/2017    TRIG 157 (H) 07/29/2017    HDL 34 (L) 07/29/2017    CHOLHDL 19.2 (L) 07/29/2017    TOTALCHOLEST 5.2 (H) 07/29/2017    ALBUMIN 3.5 07/29/2017    BILIDIR 0.3 05/12/2017    AST 36 07/29/2017    AST 57 (H) 08/22/2012    ALT 40 07/29/2017    ALKPHOS 36 (L) 07/29/2017    ALKPHOS 76 08/22/2012    MG 2.1 05/13/2016    LABPROT 10.6 03/17/2017    INR 1.0 03/17/2017    INR 1.06 08/22/2012    PSA 0.15 06/29/2012       Assessment and Plan:  NAFLD: w/ moderate fibrosis based on fibroscan  -LFTs improved with weight loss  -no symptoms of decompensated liver disease  -encouraged continued weight loss efforts  -control alcohol intake  -recommend optimal management of HTN, HLD, DM  -will repeat labs in 6 months to monitor  Elevated bilirubin: presumed Gilbert's. This is benign and requires no further f/u  History of hepatitis C: undetectable PCR  -patient has received the Hep B vaccine    EDUCATION:   -We discussed the manifestations of NAFLD. At this time there is no FDA approved therapy for NAFLD.   -The patient and I  discussed the importance of diet, exercise, and weight loss for management of NAFLD. We discussed a low fat, low carb/low sugar, high fiber diet, and a goal of 30 minutes of exercise 5 times per week.   -optimal medical management of HTN, HLD, DM which is known to be risk factors for fatty liver  -Pt is aware that fatty liver can progress to steatohepatitis and possibly to cirrhosis, putting one at increased risk for liver cancer, liver failure, and death.     RTC 12 months w/ pre visit labs    Patient Active Problem List   Diagnosis    DM II (diabetes mellitus, type II), controlled    HTN (hypertension), 2008    Abnormal cardiovascular stress test    Ventral hernia, repaired 12/12/2013    Incisional hernia, repaired 12/12/2013    STILES (dyspnea on exertion)    Peripheral edema    Fatigue    OA (osteoarthritis)    LVH (left ventricular hypertrophy)    Stress at home and work    Sleep deprivation    ED (erectile dysfunction)    Sedentary lifestyle    Type 2 diabetes mellitus with hyperglycemia    Essential hypertension    Hypertriglyceridemia    Hypovitaminosis D    Plantar fasciitis, left     Jorge Sharp is a 57 y.o. male withFatty Liver

## 2017-09-01 ENCOUNTER — CLINICAL SUPPORT (OUTPATIENT)
Dept: INTERNAL MEDICINE | Facility: CLINIC | Age: 57
End: 2017-09-01
Payer: COMMERCIAL

## 2017-09-01 DIAGNOSIS — Z23 NEED FOR HEPATITIS B VACCINATION: Primary | ICD-10-CM

## 2017-09-01 PROCEDURE — 90471 IMMUNIZATION ADMIN: CPT | Mod: S$GLB,,, | Performed by: FAMILY MEDICINE

## 2017-09-01 PROCEDURE — 90746 HEPB VACCINE 3 DOSE ADULT IM: CPT | Mod: S$GLB,,, | Performed by: FAMILY MEDICINE

## 2017-09-01 NOTE — PROGRESS NOTES
Two person identification name, d.o.b with verbal feedback.  Aseptic technique used.  Administration #2 Hep B vaccine to the  L Deltoid IM given.  Tolerated well.  waited 15 min.  VIS 7/20/16 given/mp.

## 2017-10-01 DIAGNOSIS — E11.9 DM II (DIABETES MELLITUS, TYPE II), CONTROLLED: ICD-10-CM

## 2017-10-02 RX ORDER — PEN NEEDLE, DIABETIC 31 GX5/16"
NEEDLE, DISPOSABLE MISCELLANEOUS
Qty: 300 EACH | Refills: 11 | Status: SHIPPED | OUTPATIENT
Start: 2017-10-02 | End: 2017-10-02 | Stop reason: SDUPTHER

## 2017-10-02 RX ORDER — PEN NEEDLE, DIABETIC 30 GX3/16"
NEEDLE, DISPOSABLE MISCELLANEOUS
Qty: 400 EACH | Refills: 3 | Status: SHIPPED | OUTPATIENT
Start: 2017-10-02 | End: 2018-10-25 | Stop reason: SDUPTHER

## 2017-11-25 ENCOUNTER — LAB VISIT (OUTPATIENT)
Dept: LAB | Facility: HOSPITAL | Age: 57
End: 2017-11-25
Attending: FAMILY MEDICINE
Payer: COMMERCIAL

## 2017-11-25 DIAGNOSIS — Z23 NEED FOR HEPATITIS B VACCINATION: ICD-10-CM

## 2017-11-25 DIAGNOSIS — E11.69 DIABETES MELLITUS TYPE 2 IN OBESE: ICD-10-CM

## 2017-11-25 DIAGNOSIS — R35.1 NOCTURIA: ICD-10-CM

## 2017-11-25 DIAGNOSIS — E66.9 DIABETES MELLITUS TYPE 2 IN OBESE: ICD-10-CM

## 2017-11-25 LAB
ALBUMIN SERPL BCP-MCNC: 3.7 G/DL
ALP SERPL-CCNC: 41 U/L
ALT SERPL W/O P-5'-P-CCNC: 60 U/L
ANION GAP SERPL CALC-SCNC: 8 MMOL/L
AST SERPL-CCNC: 41 U/L
BILIRUB SERPL-MCNC: 1.6 MG/DL
BUN SERPL-MCNC: 19 MG/DL
CALCIUM SERPL-MCNC: 9.9 MG/DL
CHLORIDE SERPL-SCNC: 104 MMOL/L
CO2 SERPL-SCNC: 28 MMOL/L
COMPLEXED PSA SERPL-MCNC: 0.17 NG/ML
CREAT SERPL-MCNC: 1.2 MG/DL
EST. GFR  (AFRICAN AMERICAN): >60 ML/MIN/1.73 M^2
EST. GFR  (NON AFRICAN AMERICAN): >60 ML/MIN/1.73 M^2
ESTIMATED AVG GLUCOSE: 120 MG/DL
GLUCOSE SERPL-MCNC: 113 MG/DL
HBA1C MFR BLD HPLC: 5.8 %
MAGNESIUM SERPL-MCNC: 2.3 MG/DL
POTASSIUM SERPL-SCNC: 3.9 MMOL/L
PROT SERPL-MCNC: 7.6 G/DL
SODIUM SERPL-SCNC: 140 MMOL/L

## 2017-11-25 PROCEDURE — 86704 HEP B CORE ANTIBODY TOTAL: CPT

## 2017-11-25 PROCEDURE — 83735 ASSAY OF MAGNESIUM: CPT

## 2017-11-25 PROCEDURE — 80053 COMPREHEN METABOLIC PANEL: CPT

## 2017-11-25 PROCEDURE — 36415 COLL VENOUS BLD VENIPUNCTURE: CPT | Mod: PO

## 2017-11-25 PROCEDURE — 86706 HEP B SURFACE ANTIBODY: CPT

## 2017-11-25 PROCEDURE — 83036 HEMOGLOBIN GLYCOSYLATED A1C: CPT

## 2017-11-25 PROCEDURE — 84153 ASSAY OF PSA TOTAL: CPT

## 2017-11-27 LAB
HBV CORE AB SERPL QL IA: NEGATIVE
HBV SURFACE AB SER-ACNC: NEGATIVE M[IU]/ML

## 2017-11-30 ENCOUNTER — TELEPHONE (OUTPATIENT)
Dept: FAMILY MEDICINE | Facility: CLINIC | Age: 57
End: 2017-11-30

## 2017-11-30 NOTE — TELEPHONE ENCOUNTER
----- Message from Elias Allen MD sent at 11/27/2017  6:05 PM CST -----  I note some minor lab abnormalities that have been stable over time, these are of doubtful clinical significance;chronic fatty liver. Needs to loose weight. Neg hep B, please consider vaccine.Diabetic levels are stable.

## 2017-12-18 ENCOUNTER — PATIENT MESSAGE (OUTPATIENT)
Dept: FAMILY MEDICINE | Facility: CLINIC | Age: 57
End: 2017-12-18

## 2017-12-20 DIAGNOSIS — Z79.4 TYPE 2 DIABETES MELLITUS WITHOUT COMPLICATION, WITH LONG-TERM CURRENT USE OF INSULIN: Primary | ICD-10-CM

## 2017-12-20 DIAGNOSIS — E11.9 TYPE 2 DIABETES MELLITUS WITHOUT COMPLICATION, WITH LONG-TERM CURRENT USE OF INSULIN: Primary | ICD-10-CM

## 2017-12-22 ENCOUNTER — CLINICAL SUPPORT (OUTPATIENT)
Dept: FAMILY MEDICINE | Facility: CLINIC | Age: 57
End: 2017-12-22
Attending: FAMILY MEDICINE
Payer: COMMERCIAL

## 2017-12-22 ENCOUNTER — OFFICE VISIT (OUTPATIENT)
Dept: FAMILY MEDICINE | Facility: CLINIC | Age: 57
End: 2017-12-22
Payer: COMMERCIAL

## 2017-12-22 VITALS
BODY MASS INDEX: 33.77 KG/M2 | DIASTOLIC BLOOD PRESSURE: 67 MMHG | HEIGHT: 70 IN | WEIGHT: 235.88 LBS | TEMPERATURE: 98 F | SYSTOLIC BLOOD PRESSURE: 120 MMHG | HEART RATE: 69 BPM

## 2017-12-22 DIAGNOSIS — E11.22 CONTROLLED TYPE 2 DIABETES MELLITUS WITH STAGE 1 CHRONIC KIDNEY DISEASE, WITHOUT LONG-TERM CURRENT USE OF INSULIN: Primary | ICD-10-CM

## 2017-12-22 DIAGNOSIS — I10 ESSENTIAL HYPERTENSION: ICD-10-CM

## 2017-12-22 DIAGNOSIS — N18.1 CONTROLLED TYPE 2 DIABETES MELLITUS WITH STAGE 1 CHRONIC KIDNEY DISEASE, WITHOUT LONG-TERM CURRENT USE OF INSULIN: Primary | ICD-10-CM

## 2017-12-22 DIAGNOSIS — N18.1 CONTROLLED TYPE 2 DIABETES MELLITUS WITH STAGE 1 CHRONIC KIDNEY DISEASE, WITHOUT LONG-TERM CURRENT USE OF INSULIN: ICD-10-CM

## 2017-12-22 DIAGNOSIS — E11.22 CONTROLLED TYPE 2 DIABETES MELLITUS WITH STAGE 1 CHRONIC KIDNEY DISEASE, WITHOUT LONG-TERM CURRENT USE OF INSULIN: ICD-10-CM

## 2017-12-22 PROCEDURE — 99999 PR PBB SHADOW E&M-EST. PATIENT-LVL III: CPT | Mod: PBBFAC,,, | Performed by: FAMILY MEDICINE

## 2017-12-22 PROCEDURE — 99999 PR PBB SHADOW E&M-EST. PATIENT-LVL II: CPT | Mod: PBBFAC,,,

## 2017-12-22 PROCEDURE — 92250 FUNDUS PHOTOGRAPHY W/I&R: CPT | Mod: S$GLB,,, | Performed by: OPTOMETRIST

## 2017-12-22 PROCEDURE — 99214 OFFICE O/P EST MOD 30 MIN: CPT | Mod: S$GLB,,, | Performed by: FAMILY MEDICINE

## 2017-12-22 RX ORDER — HYDROCHLOROTHIAZIDE 12.5 MG/1
12.5 TABLET ORAL DAILY PRN
Qty: 90 TABLET | Refills: 3
Start: 2017-12-22 | End: 2018-03-22

## 2017-12-22 RX ORDER — INSULIN GLARGINE 100 [IU]/ML
60 INJECTION, SOLUTION SUBCUTANEOUS DAILY
Start: 2017-12-22 | End: 2018-02-21 | Stop reason: SDUPTHER

## 2017-12-22 NOTE — PROGRESS NOTES
Jorge Sharp is a 57 y.o. male here for a diabetic eye screening with non-dilated fundus photos per Dr. MAURICE Allen.    Patient cooperative?: yes  Small pupils?: no  Last eye exam: 2016    For exam results, see Encounter Report.

## 2017-12-22 NOTE — PROGRESS NOTES
Subjective:       Patient ID: Jorge Sharp is a 57 y.o. male.    Chief Complaint: Hypertension; Diabetes; and Flu Vaccine    Hypertension   This is a chronic problem. The current episode started more than 1 year ago. The problem has been rapidly improving since onset. The problem is controlled. Pertinent negatives include no blurred vision, chest pain, headaches, malaise/fatigue, neck pain, orthopnea, palpitations or shortness of breath. The current treatment provides significant improvement. Compliance problems include exercise.  Identifiable causes of hypertension include chronic renal disease and sleep apnea.   Diabetes   He presents for his follow-up diabetic visit. He has type 2 diabetes mellitus. There are no hypoglycemic associated symptoms. Pertinent negatives for hypoglycemia include no dizziness or headaches. Pertinent negatives for diabetes include no blurred vision, no chest pain and no fatigue. Symptoms are improving.     Review of Systems   Constitutional: Negative for fatigue, malaise/fatigue and unexpected weight change.   Eyes: Negative for blurred vision.   Respiratory: Negative for chest tightness and shortness of breath.    Cardiovascular: Negative for chest pain, palpitations, orthopnea and leg swelling.   Gastrointestinal: Negative for abdominal pain.   Musculoskeletal: Negative for arthralgias and neck pain.   Neurological: Negative for dizziness, syncope, light-headedness and headaches.       Patient Active Problem List   Diagnosis    DM II (diabetes mellitus, type II), controlled    HTN (hypertension), 2008    Abnormal cardiovascular stress test    Ventral hernia, repaired 12/12/2013    Incisional hernia, repaired 12/12/2013    STILES (dyspnea on exertion)    Peripheral edema    Fatigue    OA (osteoarthritis)    LVH (left ventricular hypertrophy)    Stress at home and work    Sleep deprivation    ED (erectile dysfunction)    Sedentary lifestyle    Type 2 diabetes  mellitus with hyperglycemia    Essential hypertension    Hypertriglyceridemia    Hypovitaminosis D    Plantar fasciitis, left       Objective:      Physical Exam    Lab Results   Component Value Date    WBC 5.36 07/29/2017    HGB 13.1 (L) 07/29/2017    HCT 38.7 (L) 07/29/2017     07/29/2017    CHOL 177 07/29/2017    TRIG 157 (H) 07/29/2017    HDL 34 (L) 07/29/2017    ALT 60 (H) 11/25/2017    AST 41 (H) 11/25/2017     11/25/2017    K 3.9 11/25/2017     11/25/2017    CREATININE 1.2 11/25/2017    BUN 19 11/25/2017    CO2 28 11/25/2017    TSH 1.798 09/16/2016    PSA 0.15 06/29/2012    INR 1.0 03/17/2017    HGBA1C 5.8 (H) 11/25/2017     The 10-year ASCVD risk score (Geetha PARIKH Jr., et al., 2013) is: 14.4%    Values used to calculate the score:      Age: 57 years      Sex: Male      Is Non- : No      Diabetic: Yes      Tobacco smoker: No      Systolic Blood Pressure: 120 mmHg      Is BP treated: Yes      HDL Cholesterol: 34 mg/dL      Total Cholesterol: 177 mg/dL    Assessment:       1. Controlled type 2 diabetes mellitus with stage 1 chronic kidney disease, without long-term current use of insulin    2. Essential hypertension        Plan:       Controlled type 2 diabetes mellitus with stage 1 chronic kidney disease, without long-term current use of insulin  -     Basic metabolic panel; Future; Expected date: 12/22/2017  -     Cancel: Hemoglobin A1c; Future; Expected date: 12/22/2017  -     Cancel: MICROALBUMIN / CREATININE RATIO URINE  -     Lipid panel; Future; Expected date: 12/22/2017  -     Diabetic Eye Screening Photo; Future  -     FRUCTOSAMINE; Future; Expected date: 12/22/2017  -     Hemoglobin A1c; Future; Expected date: 12/22/2017    Essential hypertension  -     hydroCHLOROthiazide (HYDRODIURIL) 12.5 MG Tab; Take 1 tablet (12.5 mg total) by mouth daily as needed.  Dispense: 90 tablet; Refill: 3    Other orders  -     insulin glargine (BASAGLAR KWIKPEN) 100 unit/mL (3  mL) InPn pen; Inject 60 Units into the skin once daily.      Patient readiness: acceptance and barriers:none    During the course of the visit the patient was educated and counseled about the following:     Diabetes:  Discussed general issues about diabetes pathophysiology and management.  Hypertension:   Screening labs for initial evaluation: basic metabolic panel.  Screening for causes of secondary hypertension: GFR (chronic kidney disease).  Dietary sodium restriction.  Regular aerobic exercise.  Check blood pressures daily and record.  Follow up: 6 weeks and as needed.  Obesity:   General weight loss/lifestyle modification strategies discussed (elicit support from others; identify saboteurs; non-food rewards, etc).    Goals: Diabetes: Maintain Hemoglobin A1C below 7, Hypertension: Reduce Blood Pressure and Obesity: Reduce calorie intake and BMI    Did patient meet goals/outcomes: Yes    The following self management tools provided: blood pressure log  blood glucose log  excercise log    Patient Instructions (the written plan) was given to the patient/family.     Time spent with patient: 30 minutes    Barriers to medications present (yes )    Adverse reactions to current medications (no)    Over the counter medications reviewed (Yes)        30 minutes spent counseling patient on diet, exercise, and weight loss.  This has been fully explained to the patient, who indicates understanding.

## 2017-12-26 DIAGNOSIS — E11.3393 MODERATE NONPROLIFERATIVE DIABETIC RETINOPATHY OF BOTH EYES ASSOCIATED WITH TYPE 2 DIABETES MELLITUS, MACULAR EDEMA PRESENCE UNSPECIFIED: Primary | ICD-10-CM

## 2018-01-11 ENCOUNTER — PATIENT MESSAGE (OUTPATIENT)
Dept: FAMILY MEDICINE | Facility: CLINIC | Age: 58
End: 2018-01-11

## 2018-02-01 DIAGNOSIS — Z23 NEED FOR HEPATITIS B VACCINATION: Primary | ICD-10-CM

## 2018-02-02 ENCOUNTER — CLINICAL SUPPORT (OUTPATIENT)
Dept: INTERNAL MEDICINE | Facility: CLINIC | Age: 58
End: 2018-02-02
Payer: COMMERCIAL

## 2018-02-02 PROCEDURE — 90746 HEPB VACCINE 3 DOSE ADULT IM: CPT | Mod: S$GLB,,, | Performed by: FAMILY MEDICINE

## 2018-02-02 PROCEDURE — 90471 IMMUNIZATION ADMIN: CPT | Mod: S$GLB,,, | Performed by: FAMILY MEDICINE

## 2018-02-02 NOTE — PROGRESS NOTES
Two person identification name, d.o.b with verbal feedback.  Aseptic technique used.  Administration # 3 Hep B  vaccine to the  L Deltoid IM given.  Tolerated well.  waited 15 min.  VIS given./mp

## 2018-02-17 ENCOUNTER — LAB VISIT (OUTPATIENT)
Dept: LAB | Facility: HOSPITAL | Age: 58
End: 2018-02-17
Attending: FAMILY MEDICINE
Payer: COMMERCIAL

## 2018-02-17 DIAGNOSIS — N18.1 CONTROLLED TYPE 2 DIABETES MELLITUS WITH STAGE 1 CHRONIC KIDNEY DISEASE, WITHOUT LONG-TERM CURRENT USE OF INSULIN: ICD-10-CM

## 2018-02-17 DIAGNOSIS — E11.22 CONTROLLED TYPE 2 DIABETES MELLITUS WITH STAGE 1 CHRONIC KIDNEY DISEASE, WITHOUT LONG-TERM CURRENT USE OF INSULIN: ICD-10-CM

## 2018-02-17 DIAGNOSIS — K76.0 NAFLD (NONALCOHOLIC FATTY LIVER DISEASE): ICD-10-CM

## 2018-02-17 LAB
ALBUMIN SERPL BCP-MCNC: 3.9 G/DL
ALP SERPL-CCNC: 49 U/L
ALT SERPL W/O P-5'-P-CCNC: 67 U/L
ANION GAP SERPL CALC-SCNC: 9 MMOL/L
AST SERPL-CCNC: 46 U/L
BILIRUB DIRECT SERPL-MCNC: 0.5 MG/DL
BILIRUB SERPL-MCNC: 1.6 MG/DL
BUN SERPL-MCNC: 19 MG/DL
CALCIUM SERPL-MCNC: 9.9 MG/DL
CHLORIDE SERPL-SCNC: 102 MMOL/L
CHOLEST SERPL-MCNC: 193 MG/DL
CHOLEST/HDLC SERPL: 5.7 {RATIO}
CO2 SERPL-SCNC: 28 MMOL/L
CREAT SERPL-MCNC: 1.6 MG/DL
EST. GFR  (AFRICAN AMERICAN): 54.9 ML/MIN/1.73 M^2
EST. GFR  (NON AFRICAN AMERICAN): 47.5 ML/MIN/1.73 M^2
ESTIMATED AVG GLUCOSE: 143 MG/DL
GLUCOSE SERPL-MCNC: 188 MG/DL
HBA1C MFR BLD HPLC: 6.6 %
HDLC SERPL-MCNC: 34 MG/DL
HDLC SERPL: 17.6 %
LDLC SERPL CALC-MCNC: 113.6 MG/DL
NONHDLC SERPL-MCNC: 159 MG/DL
POTASSIUM SERPL-SCNC: 4.2 MMOL/L
PROT SERPL-MCNC: 8 G/DL
SODIUM SERPL-SCNC: 139 MMOL/L
TRIGL SERPL-MCNC: 227 MG/DL

## 2018-02-17 PROCEDURE — 80048 BASIC METABOLIC PNL TOTAL CA: CPT

## 2018-02-17 PROCEDURE — 80061 LIPID PANEL: CPT

## 2018-02-17 PROCEDURE — 36415 COLL VENOUS BLD VENIPUNCTURE: CPT | Mod: PO

## 2018-02-17 PROCEDURE — 80076 HEPATIC FUNCTION PANEL: CPT

## 2018-02-17 PROCEDURE — 83036 HEMOGLOBIN GLYCOSYLATED A1C: CPT

## 2018-02-17 PROCEDURE — 82985 ASSAY OF GLYCATED PROTEIN: CPT

## 2018-02-21 ENCOUNTER — OFFICE VISIT (OUTPATIENT)
Dept: FAMILY MEDICINE | Facility: CLINIC | Age: 58
End: 2018-02-21
Payer: COMMERCIAL

## 2018-02-21 ENCOUNTER — TELEPHONE (OUTPATIENT)
Dept: HEPATOLOGY | Facility: CLINIC | Age: 58
End: 2018-02-21

## 2018-02-21 ENCOUNTER — TELEPHONE (OUTPATIENT)
Dept: OPHTHALMOLOGY | Facility: CLINIC | Age: 58
End: 2018-02-21

## 2018-02-21 ENCOUNTER — TELEPHONE (OUTPATIENT)
Dept: NEPHROLOGY | Facility: CLINIC | Age: 58
End: 2018-02-21

## 2018-02-21 VITALS
BODY MASS INDEX: 33.21 KG/M2 | TEMPERATURE: 99 F | DIASTOLIC BLOOD PRESSURE: 72 MMHG | SYSTOLIC BLOOD PRESSURE: 116 MMHG | WEIGHT: 231.94 LBS | HEART RATE: 66 BPM | OXYGEN SATURATION: 98 % | HEIGHT: 70 IN

## 2018-02-21 DIAGNOSIS — E11.65 TYPE 2 DIABETES MELLITUS WITH HYPERGLYCEMIA, WITH LONG-TERM CURRENT USE OF INSULIN: ICD-10-CM

## 2018-02-21 DIAGNOSIS — I10 ESSENTIAL HYPERTENSION: ICD-10-CM

## 2018-02-21 DIAGNOSIS — E11.21 DIABETIC NEPHROPATHY WITH PROTEINURIA: ICD-10-CM

## 2018-02-21 DIAGNOSIS — Z79.4 TYPE 2 DIABETES MELLITUS WITH HYPERGLYCEMIA, WITH LONG-TERM CURRENT USE OF INSULIN: ICD-10-CM

## 2018-02-21 DIAGNOSIS — K76.0 FATTY LIVER: ICD-10-CM

## 2018-02-21 DIAGNOSIS — E11.3393 MODERATE NONPROLIFERATIVE DIABETIC RETINOPATHY OF BOTH EYES ASSOCIATED WITH TYPE 2 DIABETES MELLITUS, MACULAR EDEMA PRESENCE UNSPECIFIED: Primary | ICD-10-CM

## 2018-02-21 DIAGNOSIS — E66.9 OBESITY (BMI 30.0-34.9): ICD-10-CM

## 2018-02-21 LAB — FRUCTOSAMINE SERPL-SCNC: 305 UMOL /L (ref 0–285)

## 2018-02-21 PROCEDURE — 99214 OFFICE O/P EST MOD 30 MIN: CPT | Mod: SA,S$GLB,, | Performed by: PHYSICIAN ASSISTANT

## 2018-02-21 PROCEDURE — 3074F SYST BP LT 130 MM HG: CPT | Mod: CPTII,S$GLB,, | Performed by: PHYSICIAN ASSISTANT

## 2018-02-21 PROCEDURE — 3078F DIAST BP <80 MM HG: CPT | Mod: CPTII,S$GLB,, | Performed by: PHYSICIAN ASSISTANT

## 2018-02-21 PROCEDURE — 99999 PR PBB SHADOW E&M-EST. PATIENT-LVL V: CPT | Mod: PBBFAC,,, | Performed by: PHYSICIAN ASSISTANT

## 2018-02-21 RX ORDER — INSULIN GLARGINE 100 [IU]/ML
INJECTION, SOLUTION SUBCUTANEOUS
Start: 2018-02-21 | End: 2018-02-26 | Stop reason: SDUPTHER

## 2018-02-21 NOTE — TELEPHONE ENCOUNTER
----- Message from Hua Hollis PA-C sent at 2/21/2018  8:00 AM CST -----  Please let him know these labs are stable. He will be due for f/u in 08/2018 in liver clinic  Thanks

## 2018-02-21 NOTE — TELEPHONE ENCOUNTER
----- Message from Dyana Kiran sent at 2/21/2018  3:47 PM CST -----  Pt was seen today and needs an urgent appt for Moderate nonproliferative diabetic retinopathy of both eyes associated with type 2 diabetes mellitus, macular edema presence unspecified  Please call him @ [phone]  Thanks !

## 2018-02-21 NOTE — PROGRESS NOTES
Subjective:       Patient ID: Jorge Sharp is a 57 y.o. male.    Chief Complaint: Follow-up (3mth f/u)    Diabetes   He has type 2 diabetes mellitus. No MedicAlert identification noted. The initial diagnosis of diabetes was made 20 years ago. Pertinent negatives for hypoglycemia include no confusion, dizziness, headaches, hunger, mood changes, nervousness/anxiousness, pallor, seizures, sleepiness, speech difficulty, sweats or tremors. Pertinent negatives for diabetes include no blurred vision, no chest pain, no fatigue, no foot paresthesias, no foot ulcerations, no polydipsia, no polyphagia, no polyuria, no visual change, no weakness and no weight loss. Pertinent negatives for hypoglycemia complications include no blackouts, no hospitalization, no nocturnal hypoglycemia, no required assistance and no required glucagon injection. Symptoms are improving. Diabetic complications include impotence. Pertinent negatives for diabetic complications include no autonomic neuropathy, CVA, heart disease, nephropathy, peripheral neuropathy, PVD or retinopathy. Risk factors for coronary artery disease include dyslipidemia, family history, hypertension, obesity, sedentary lifestyle, diabetes mellitus and male sex. Current diabetic treatment includes diet, insulin injections and oral agent (triple therapy). He is compliant with treatment most of the time. He is currently taking insulin pre-breakfast and pre-dinner. Insulin injections are given by patient. Rotation sites for injection include the abdominal wall. His weight is fluctuating minimally. He is following a generally healthy and low salt diet. Meal planning includes avoidance of concentrated sweets and carbohydrate counting. He has had a previous visit with a dietitian. He rarely participates in exercise. He monitors blood glucose at home 3-4 x per day. Blood glucose monitoring compliance is excellent. His home blood glucose trend is fluctuating minimally. He does  not see a podiatrist.Eye exam is current.     Review of Systems   Constitutional: Negative for fatigue and weight loss.   Eyes: Negative for blurred vision.   Cardiovascular: Negative for chest pain.   Endocrine: Negative for polydipsia, polyphagia and polyuria.   Genitourinary: Positive for impotence.   Skin: Negative for pallor.   Neurological: Negative for dizziness, tremors, seizures, speech difficulty, weakness and headaches.   Psychiatric/Behavioral: Negative for confusion. The patient is not nervous/anxious.        Objective:      Physical Exam   Constitutional: He is oriented to person, place, and time.   HENT:   Mouth/Throat: Oropharynx is clear and moist. No oropharyngeal exudate.   Eyes: Conjunctivae are normal. Pupils are equal, round, and reactive to light.   Cardiovascular: Normal rate and regular rhythm.    Pulmonary/Chest: Effort normal and breath sounds normal. He has no wheezes.   Abdominal: Soft. Bowel sounds are normal. There is no tenderness.   Musculoskeletal: He exhibits no edema.   Lymphadenopathy:     He has no cervical adenopathy.   Neurological: He is alert and oriented to person, place, and time.   Skin: No erythema.   Psychiatric: His behavior is normal.       Assessment:       1. Moderate nonproliferative diabetic retinopathy of both eyes associated with type 2 diabetes mellitus, macular edema presence unspecified    2. Type 2 diabetes mellitus with hyperglycemia, with long-term current use of insulin    3. Essential hypertension    4. Diabetic nephropathy with proteinuria    5. Fatty liver    6. Obesity (BMI 30.0-34.9)        Plan:     Jorge was seen today for follow-up.    Diagnoses and all orders for this visit:    Moderate nonproliferative diabetic retinopathy of both eyes associated with type 2 diabetes mellitus, macular edema presence unspecified  -     Ambulatory Referral to Ophthalmology  Retinopathy seen on screening in office.  Type 2 diabetes mellitus with hyperglycemia,  with long-term current use of insulin  -     Discontinue: liraglutide 0.6 mg/0.1 mL, 18 mg/3 mL, subq PNIJ (VICTOZA 2-ROSARIO) 0.6 mg/0.1 mL (18 mg/3 mL) PnIj; Inject 1.8 mg into the skin once daily.  -     liraglutide 0.6 mg/0.1 mL, 18 mg/3 mL, subq PNIJ (VICTOZA 2-ROSARIO) 0.6 mg/0.1 mL (18 mg/3 mL) PnIj; Inject 1.8 mg into the skin once daily.  -     insulin glargine (BASAGLAR KWIKPEN U-100 INSULIN) 100 unit/mL (3 mL) InPn pen; 40 units AM 30 units PM    Essential hypertension  Controlled  Low salt diet   Continue current medication  Diabetic nephropathy with proteinuria  -     Protein, urine, timed; Future  -     US Retroperitoneal Complete; Future  -     Ambulatory Referral to Nephrology    Fatty liver  Follow up with hepatology  Obesity (BMI 30.0-34.9)  Low carbohydrate, high fiber diet   Increase exercise as able.         Patient readiness: acceptance and barriers:none    During the course of the visit the patient was educated and counseled about the following:     Diabetes:  Discussed general issues about diabetes pathophysiology and management.  Educational material distributed.  Addressed ADA diet.  Suggested low cholesterol diet.  Encouraged aerobic exercise.  Discussed foot care.  Reminded to get yearly retinal exam.  Hypertension:   Medication: no change.  Dietary sodium restriction.  Regular aerobic exercise.  Obesity:   General weight loss/lifestyle modification strategies discussed (elicit support from others; identify saboteurs; non-food rewards, etc).  Informal exercise measures discussed, e.g. taking stairs instead of elevator.  Regular aerobic exercise program discussed.    Goals: Diabetes: Maintain Hemoglobin A1C below 7, Hypertension: Reduce Blood Pressure and Obesity: Reduce calorie intake and BMI    Did patient meet goals/outcomes: Yes    The following self management tools provided: declined    Patient Instructions (the written plan) was given to the patient/family.     Time spent with patient: 30  minutes    Barriers to medications present (no )    Adverse reactions to current medications (no)    Over the counter medications reviewed (Yes)

## 2018-02-21 NOTE — TELEPHONE ENCOUNTER
----- Message from Dyana Kiran sent at 2/21/2018  3:48 PM CST -----  Pt needs an appt for Diabetic nephropathy with proteinuria  He needs an appt by the end of February (next available appt is march ). Is willing to see anybody  Please call him @ [phone]

## 2018-02-22 ENCOUNTER — HOSPITAL ENCOUNTER (OUTPATIENT)
Dept: RADIOLOGY | Facility: CLINIC | Age: 58
Discharge: HOME | End: 2018-02-22
Attending: PHYSICIAN ASSISTANT
Payer: COMMERCIAL

## 2018-02-22 DIAGNOSIS — E11.21 DIABETIC NEPHROPATHY WITH PROTEINURIA: ICD-10-CM

## 2018-02-22 PROCEDURE — 76770 US EXAM ABDO BACK WALL COMP: CPT | Mod: 26,,, | Performed by: RADIOLOGY

## 2018-02-22 PROCEDURE — 76770 US EXAM ABDO BACK WALL COMP: CPT | Mod: TC,PO

## 2018-02-23 ENCOUNTER — OFFICE VISIT (OUTPATIENT)
Dept: OPTOMETRY | Facility: CLINIC | Age: 58
End: 2018-02-23
Payer: COMMERCIAL

## 2018-02-23 ENCOUNTER — LAB VISIT (OUTPATIENT)
Dept: LAB | Facility: HOSPITAL | Age: 58
End: 2018-02-23
Attending: PHYSICIAN ASSISTANT
Payer: COMMERCIAL

## 2018-02-23 DIAGNOSIS — H40.013 OPEN ANGLE WITH BORDERLINE FINDINGS OF BOTH EYES: ICD-10-CM

## 2018-02-23 DIAGNOSIS — E11.3393 MODERATE NONPROLIFERATIVE DIABETIC RETINOPATHY OF BOTH EYES WITHOUT MACULAR EDEMA ASSOCIATED WITH TYPE 2 DIABETES MELLITUS: Primary | ICD-10-CM

## 2018-02-23 DIAGNOSIS — H25.13 NUCLEAR SCLEROSIS, BILATERAL: ICD-10-CM

## 2018-02-23 DIAGNOSIS — E11.21 DIABETIC NEPHROPATHY WITH PROTEINURIA: ICD-10-CM

## 2018-02-23 DIAGNOSIS — H52.7 REFRACTIVE ERROR: ICD-10-CM

## 2018-02-23 LAB
PROT 24H UR-MRATE: 110 MG/SPEC
PROT UR-MCNC: 11 MG/DL
URINE COLLECTION DURATION: 24 HR
URINE VOLUME: 1000 ML

## 2018-02-23 PROCEDURE — 92250 FUNDUS PHOTOGRAPHY W/I&R: CPT | Mod: S$GLB,,, | Performed by: OPTOMETRIST

## 2018-02-23 PROCEDURE — 92014 COMPRE OPH EXAM EST PT 1/>: CPT | Mod: S$GLB,,, | Performed by: OPTOMETRIST

## 2018-02-23 PROCEDURE — 84156 ASSAY OF PROTEIN URINE: CPT

## 2018-02-23 PROCEDURE — 99999 PR PBB SHADOW E&M-EST. PATIENT-LVL II: CPT | Mod: PBBFAC,,, | Performed by: OPTOMETRIST

## 2018-02-23 NOTE — PROGRESS NOTES
HPI     Presenting Complaint: Pt here today for yearly diabetic eye exam.    Hemoglobin A1C       Date                     Value               Ref Range             Status                02/17/2018               6.6 (H)             4.0 - 5.6 %           Final                 11/25/2017               5.8 (H)             4.0 - 5.6 %           Final                 07/29/2017               5.9 (H)             4.0 - 5.6 %           Final                Pt states distance vision has been stable. Uses OTC readers for small   print.     Ophthalmic medication / drops: None    (-) headaches  (-) diplopia   (-) flashes / (+) hx of  floaters      Last edited by Brendan Horne, OD on 2/23/2018  8:52 AM. (History)            Assessment /Plan     For exam results, see Encounter Report.    Moderate nonproliferative diabetic retinopathy of both eyes without macular edema associated with type 2 diabetes mellitus    Open angle with borderline findings of both eyes  -     Color Fundus Photography - OU - Both Eyes  -     OCT - Optic Nerve; Future  -     Perez Visual Field - OU - Extended - Both Eyes; Future    Nuclear sclerosis, bilateral    Refractive error      Moderate NPDR OU, no CSME OU. Discussed ocular affects of uncontrolled blood sugar with patient. Recommended continued strong blood sugar control and continued care with PCP. Return in 6 months for diabetic eye exam.     Glaucoma suspect secondary to larger than average CD ratio OU. Discussed with patient. Patient scheduled to RTC for OCT, HVF 24-2 BIANCA Standard, gonio, pachs, IOP check.   (+) fam hx  (+) DM  (+) HTN  (-) sleep apnea    IOP 02/23/18:  OD 18 mmHg, OS 18 mmHg    Photos 02/23/18    Mild NS OU. Discussed possible ocular affects of cataracts. Acceptable BCVA OU. Discussed treatment options. Surgery not recommended at this time. Monitor yearly.     Pt doing well with uncorrected distance vision and OTC readers prn for near. Denies refraction, return prn for spec  Rx.      RTC as scheduled for glc workup.

## 2018-02-26 ENCOUNTER — PATIENT MESSAGE (OUTPATIENT)
Dept: FAMILY MEDICINE | Facility: CLINIC | Age: 58
End: 2018-02-26

## 2018-02-26 DIAGNOSIS — E11.65 TYPE 2 DIABETES MELLITUS WITH HYPERGLYCEMIA, WITH LONG-TERM CURRENT USE OF INSULIN: ICD-10-CM

## 2018-02-26 DIAGNOSIS — Z79.4 TYPE 2 DIABETES MELLITUS WITH HYPERGLYCEMIA, WITH LONG-TERM CURRENT USE OF INSULIN: ICD-10-CM

## 2018-02-26 RX ORDER — INSULIN GLARGINE 100 [IU]/ML
INJECTION, SOLUTION SUBCUTANEOUS
Qty: 4 BOX | Refills: 4 | Status: SHIPPED | OUTPATIENT
Start: 2018-02-26 | End: 2018-07-20 | Stop reason: SDUPTHER

## 2018-02-26 NOTE — TELEPHONE ENCOUNTER
Med list reconciled according to list patient attached vis [facility] portal. Med list accurate; no adjustments necessary.

## 2018-02-27 DIAGNOSIS — E11.69 DIABETES MELLITUS TYPE 2 IN OBESE: ICD-10-CM

## 2018-02-27 DIAGNOSIS — N18.30 CKD (CHRONIC KIDNEY DISEASE), SYMPTOM MANAGEMENT ONLY, STAGE 3 (MODERATE): Primary | ICD-10-CM

## 2018-02-27 DIAGNOSIS — E66.9 DIABETES MELLITUS TYPE 2 IN OBESE: ICD-10-CM

## 2018-02-27 DIAGNOSIS — I10 ESSENTIAL HYPERTENSION: ICD-10-CM

## 2018-02-27 RX ORDER — SPIRONOLACTONE 25 MG/1
TABLET ORAL
Qty: 90 TABLET | Refills: 3 | Status: SHIPPED | OUTPATIENT
Start: 2018-02-27 | End: 2018-07-20 | Stop reason: SDUPTHER

## 2018-02-27 RX ORDER — HYDROCHLOROTHIAZIDE 12.5 MG/1
TABLET ORAL
Qty: 90 TABLET | Refills: 3 | Status: SHIPPED | OUTPATIENT
Start: 2018-02-27 | End: 2018-02-28 | Stop reason: SDUPTHER

## 2018-02-28 ENCOUNTER — OFFICE VISIT (OUTPATIENT)
Dept: NEPHROLOGY | Facility: CLINIC | Age: 58
End: 2018-02-28
Payer: COMMERCIAL

## 2018-02-28 VITALS
DIASTOLIC BLOOD PRESSURE: 70 MMHG | OXYGEN SATURATION: 98 % | BODY MASS INDEX: 33.29 KG/M2 | WEIGHT: 232.56 LBS | SYSTOLIC BLOOD PRESSURE: 130 MMHG | HEART RATE: 84 BPM | HEIGHT: 70 IN

## 2018-02-28 DIAGNOSIS — N18.30 CKD (CHRONIC KIDNEY DISEASE) STAGE 3, GFR 30-59 ML/MIN: ICD-10-CM

## 2018-02-28 DIAGNOSIS — R60.0 PERIPHERAL EDEMA: ICD-10-CM

## 2018-02-28 DIAGNOSIS — E11.8 CONTROLLED TYPE 2 DIABETES MELLITUS WITH COMPLICATION, WITH LONG-TERM CURRENT USE OF INSULIN: ICD-10-CM

## 2018-02-28 DIAGNOSIS — I10 ESSENTIAL HYPERTENSION: Chronic | ICD-10-CM

## 2018-02-28 DIAGNOSIS — R80.9 PROTEINURIA, UNSPECIFIED TYPE: Primary | ICD-10-CM

## 2018-02-28 DIAGNOSIS — E66.9 CLASS 1 OBESITY WITH SERIOUS COMORBIDITY AND BODY MASS INDEX (BMI) OF 30.0 TO 30.9 IN ADULT, UNSPECIFIED OBESITY TYPE: ICD-10-CM

## 2018-02-28 DIAGNOSIS — Z91.89 SEDENTARY LIFESTYLE: ICD-10-CM

## 2018-02-28 DIAGNOSIS — I51.7 LVH (LEFT VENTRICULAR HYPERTROPHY): ICD-10-CM

## 2018-02-28 DIAGNOSIS — Z79.4 CONTROLLED TYPE 2 DIABETES MELLITUS WITH COMPLICATION, WITH LONG-TERM CURRENT USE OF INSULIN: ICD-10-CM

## 2018-02-28 PROCEDURE — 99204 OFFICE O/P NEW MOD 45 MIN: CPT | Mod: S$GLB,,, | Performed by: INTERNAL MEDICINE

## 2018-02-28 PROCEDURE — 3044F HG A1C LEVEL LT 7.0%: CPT | Mod: CPTII,S$GLB,, | Performed by: INTERNAL MEDICINE

## 2018-02-28 PROCEDURE — 3078F DIAST BP <80 MM HG: CPT | Mod: CPTII,S$GLB,, | Performed by: INTERNAL MEDICINE

## 2018-02-28 PROCEDURE — 99999 PR PBB SHADOW E&M-EST. PATIENT-LVL III: CPT | Mod: PBBFAC,,, | Performed by: INTERNAL MEDICINE

## 2018-02-28 PROCEDURE — 3075F SYST BP GE 130 - 139MM HG: CPT | Mod: CPTII,S$GLB,, | Performed by: INTERNAL MEDICINE

## 2018-02-28 RX ORDER — DOXAZOSIN 4 MG/1
TABLET ORAL
Qty: 90 TABLET | Refills: 3 | Status: SHIPPED | OUTPATIENT
Start: 2018-02-28 | End: 2018-07-20 | Stop reason: SDUPTHER

## 2018-02-28 RX ORDER — METFORMIN HYDROCHLORIDE 1000 MG/1
TABLET ORAL
Qty: 180 TABLET | Refills: 4 | OUTPATIENT
Start: 2018-02-28

## 2018-02-28 NOTE — LETTER
April 2, 2018      Hailey King PA-C  [address] [facility] [address]           [facility]  [address] [facility] [address] [place] [address]  Phone: [phone]          Patient: Jorge Sharp   MR Number: [id number]   YOB: 1960   Date of Visit: 2/28/2018       Dear Hailey King:    Thank you for referring Jorge Sharp to me for evaluation. Attached you will find relevant portions of my assessment and plan of care.    If you have questions, please do not hesitate to call me. I look forward to following Jorge Sharp along with you.    Sincerely,    Darrius Miranda MD    Enclosure  CC:  No Recipients    If you would like to receive this communication electronically, please contact [email or web address] or [phone] to request more information on [employer] access.    For providers and/or their staff who would like to refer a patient to [facility], please contact us through our one-stop-shop provider referral line, [facility] , at [phone].    If you feel you have received this communication in error or would no longer like to receive these types of communications, please e-mail [email or web address]

## 2018-02-28 NOTE — TELEPHONE ENCOUNTER
Due to recent decline in kidney function, metformin will have to be discontinued. Patient will need to come in next week to discuss further management of blood sugar.

## 2018-03-01 RX ORDER — METFORMIN HYDROCHLORIDE 1000 MG/1
1000 TABLET ORAL
Qty: 90 TABLET | Refills: 1
Start: 2018-03-01 | End: 2018-07-20 | Stop reason: SDUPTHER

## 2018-03-01 NOTE — TELEPHONE ENCOUNTER
I left a phone call due to creatinine 1.6. Decreased Metformin 1000 mg.Repeat chemistry in 8 to 12 weeks.

## 2018-03-02 ENCOUNTER — PATIENT MESSAGE (OUTPATIENT)
Dept: FAMILY MEDICINE | Facility: CLINIC | Age: 58
End: 2018-03-02

## 2018-03-02 NOTE — TELEPHONE ENCOUNTER
Left message for patient to call back to make sure he received Dr Allen's message regarding the dosage of his metformin.

## 2018-04-02 PROBLEM — N18.30 CKD (CHRONIC KIDNEY DISEASE) STAGE 3, GFR 30-59 ML/MIN: Status: ACTIVE | Noted: 2018-04-02

## 2018-04-02 PROBLEM — E66.9 OBESITY: Status: ACTIVE | Noted: 2018-04-02

## 2018-04-02 NOTE — PROGRESS NOTES
Subjective:       Patient ID: Jorge Sharp is a 57 y.o. White male who presents for new evaluation of Chronic Kidney Disease    HPI     He's referred bu his PCP for CKD with baseline Cr of 1/3-1.6mg/dL    He has a significant history of DM and HTN, with last Hba1c of 6.6, but previous levels have been closer to 7 or higher. He denies DM retinopathy. His HTN history is significant for high BPs but not controlled.  Positive  foamy urine but no hematuria not flank pain. He has a history of LE edema which improved with weight loss. No SOB but does have STILES. He consumes mostly water as his beverage but won't give me a straight answer on sodium intake. No NSAID use, herbal medications. No history of PRBCs. He denies family history of kidney disease but HTN runs in his family.      Review of Systems   Constitutional: Positive for fatigue. Negative for activity change, appetite change and unexpected weight change.   HENT: Negative for facial swelling.    Respiratory: Positive for shortness of breath. Negative for cough.    Cardiovascular: Positive for leg swelling. Negative for chest pain.   Gastrointestinal: Negative for abdominal distention.   Genitourinary: Positive for urgency (2-3X nocturia). Negative for difficulty urinating, dysuria, frequency and hematuria.   Musculoskeletal: Negative for arthralgias.   Neurological: Negative for weakness and headaches.   Hematological: Does not bruise/bleed easily.   Psychiatric/Behavioral: Negative for decreased concentration.       Objective:      Physical Exam   Constitutional: He is oriented to person, place, and time. He appears well-developed and well-nourished. No distress.   Neck: No JVD present.   Cardiovascular: S1 normal and S2 normal.  Exam reveals no friction rub.    Pulmonary/Chest: Breath sounds normal. He has no wheezes. He has no rales.   Abdominal: Soft. There is no tenderness.   Musculoskeletal: He exhibits no edema.   Neurological: He is alert and  oriented to person, place, and time.   Skin: Skin is warm and dry.   Psychiatric: He has a normal mood and affect.       Assessment:       1. Proteinuria, unspecified type    2. CKD (chronic kidney disease) stage 3, GFR 30-59 ml/min    3. Essential hypertension    4. Controlled type 2 diabetes mellitus with complication, with long-term current use of insulin    5. LVH (left ventricular hypertrophy)    6. Peripheral edema    7. Sedentary lifestyle    8. Class 1 obesity with serious comorbidity and body mass index (BMI) of 30.0 to 30.9 in adult, unspecified obesity type        Plan:             CKD appearing to be at Stage 3 with multiple risk factors for CKD. Continue RAAS blockade (lisinopril) for renal preservation. He will undergo further work up with a few select serologies, rule out paraprotein disease, check urine studies, and check renal u/s to evaluate size and echogenicity of kidneys.     HTN is controlled    He was advised to follow a low sodium diet, ensure hydration and avoid NSAIDs    Mineral and Bone Disease--check PTH and D3 levels      RTC with review of results

## 2018-04-04 RX ORDER — LIRAGLUTIDE 6 MG/ML
INJECTION SUBCUTANEOUS
Qty: 9 SYRINGE | Refills: 11 | Status: SHIPPED | OUTPATIENT
Start: 2018-04-04 | End: 2018-07-03

## 2018-04-05 DIAGNOSIS — E11.69 DIABETES MELLITUS TYPE 2 IN OBESE: ICD-10-CM

## 2018-04-05 DIAGNOSIS — E66.9 DIABETES MELLITUS TYPE 2 IN OBESE: ICD-10-CM

## 2018-04-05 RX ORDER — METFORMIN HYDROCHLORIDE 1000 MG/1
TABLET ORAL
Qty: 180 TABLET | Refills: 4 | Status: SHIPPED | OUTPATIENT
Start: 2018-04-05 | End: 2018-07-20 | Stop reason: SDUPTHER

## 2018-05-21 ENCOUNTER — OFFICE VISIT (OUTPATIENT)
Dept: OPTOMETRY | Facility: CLINIC | Age: 58
End: 2018-05-21
Payer: COMMERCIAL

## 2018-05-21 ENCOUNTER — CLINICAL SUPPORT (OUTPATIENT)
Dept: OPHTHALMOLOGY | Facility: CLINIC | Age: 58
End: 2018-05-21
Payer: COMMERCIAL

## 2018-05-21 DIAGNOSIS — H40.013 OPEN ANGLE WITH BORDERLINE FINDINGS OF BOTH EYES: Primary | ICD-10-CM

## 2018-05-21 DIAGNOSIS — H40.013 OPEN ANGLE WITH BORDERLINE FINDINGS OF BOTH EYES: ICD-10-CM

## 2018-05-21 PROCEDURE — 99999 PR PBB SHADOW E&M-EST. PATIENT-LVL I: CPT | Mod: PBBFAC,,, | Performed by: OPTOMETRIST

## 2018-05-21 PROCEDURE — 92133 CPTRZD OPH DX IMG PST SGM ON: CPT | Mod: S$GLB,,, | Performed by: OPTOMETRIST

## 2018-05-21 PROCEDURE — 76514 ECHO EXAM OF EYE THICKNESS: CPT | Mod: S$GLB,,, | Performed by: OPTOMETRIST

## 2018-05-21 PROCEDURE — 92083 EXTENDED VISUAL FIELD XM: CPT | Mod: S$GLB,,, | Performed by: OPTOMETRIST

## 2018-05-21 PROCEDURE — 92012 INTRM OPH EXAM EST PATIENT: CPT | Mod: S$GLB,,, | Performed by: OPTOMETRIST

## 2018-05-21 RX ORDER — HYDROCHLOROTHIAZIDE 12.5 MG/1
TABLET ORAL
COMMUNITY
Start: 2018-05-18 | End: 2018-07-20

## 2018-05-21 NOTE — PROGRESS NOTES
HPI     Presenting Complaint:Pt here today for yearly glaucoma screening. HVF   24-2 SS, OCT, IOP, PACH done today. No new ocular complaints.     Ophthalmic medication / drops:None    (-) headaches  (-) diplopia   (-) flashes / (-) floaters      Last edited by Jaren Moss on 5/21/2018  2:08 PM. (History)            Assessment /Plan     For exam results, see Encounter Report.    Open angle with borderline findings of both eyes      Glaucoma suspect secondary to larger than average CD ratio OU. Discussed results with patient. No evidence of glc at this time, no drops. Monitor yearly.    (+) fam hx  (+) DM  (+) HTN  (-) sleep apnea     IOP 05/21/18:              OD 16 mmHg, OS 16 mmHg     Photos 02/23/18    OCT 05/21/18  OD borderline G, TS, T     OS ONL G, N    Borderline NI    Pach 5/21/18  OD  601         HVF 5/21/18  OD within normal limits     OS within normal limits      RTC in 6 months for DM retinopathy DFE, or sooner prn.

## 2018-07-18 ENCOUNTER — PATIENT OUTREACH (OUTPATIENT)
Dept: ADMINISTRATIVE | Facility: HOSPITAL | Age: 58
End: 2018-07-18

## 2018-07-20 ENCOUNTER — OFFICE VISIT (OUTPATIENT)
Dept: FAMILY MEDICINE | Facility: CLINIC | Age: 58
End: 2018-07-20

## 2018-07-20 ENCOUNTER — LAB VISIT (OUTPATIENT)
Dept: LAB | Facility: HOSPITAL | Age: 58
End: 2018-07-20
Attending: FAMILY MEDICINE

## 2018-07-20 VITALS
HEIGHT: 67 IN | WEIGHT: 231.5 LBS | HEART RATE: 73 BPM | BODY MASS INDEX: 36.34 KG/M2 | SYSTOLIC BLOOD PRESSURE: 118 MMHG | DIASTOLIC BLOOD PRESSURE: 71 MMHG | TEMPERATURE: 98 F

## 2018-07-20 DIAGNOSIS — Z79.4 TYPE 2 DIABETES MELLITUS WITH HYPERGLYCEMIA, WITH LONG-TERM CURRENT USE OF INSULIN: ICD-10-CM

## 2018-07-20 DIAGNOSIS — I10 ESSENTIAL HYPERTENSION: Chronic | ICD-10-CM

## 2018-07-20 DIAGNOSIS — E11.65 TYPE 2 DIABETES MELLITUS WITH HYPERGLYCEMIA, WITH LONG-TERM CURRENT USE OF INSULIN: ICD-10-CM

## 2018-07-20 DIAGNOSIS — E66.9 DIABETES MELLITUS TYPE 2 IN OBESE: ICD-10-CM

## 2018-07-20 DIAGNOSIS — Z23 NEED FOR HEPATITIS B VACCINATION: ICD-10-CM

## 2018-07-20 DIAGNOSIS — E78.1 HYPERTRIGLYCERIDEMIA: ICD-10-CM

## 2018-07-20 DIAGNOSIS — N18.30 CKD (CHRONIC KIDNEY DISEASE) STAGE 3, GFR 30-59 ML/MIN: ICD-10-CM

## 2018-07-20 DIAGNOSIS — Z79.4 TYPE 2 DIABETES MELLITUS WITH HYPERGLYCEMIA, WITH LONG-TERM CURRENT USE OF INSULIN: Primary | ICD-10-CM

## 2018-07-20 DIAGNOSIS — E11.65 TYPE 2 DIABETES MELLITUS WITH HYPERGLYCEMIA, WITH LONG-TERM CURRENT USE OF INSULIN: Primary | ICD-10-CM

## 2018-07-20 DIAGNOSIS — E11.69 DIABETES MELLITUS TYPE 2 IN OBESE: ICD-10-CM

## 2018-07-20 DIAGNOSIS — I15.0 RENOVASCULAR HYPERTENSION: Chronic | ICD-10-CM

## 2018-07-20 LAB
ALBUMIN SERPL BCP-MCNC: 3.8 G/DL
ALP SERPL-CCNC: 48 U/L
ALT SERPL W/O P-5'-P-CCNC: 64 U/L
AMMONIA PLAS-SCNC: 19 UMOL/L
ANION GAP SERPL CALC-SCNC: 8 MMOL/L
AST SERPL-CCNC: 35 U/L
BILIRUB SERPL-MCNC: 2.2 MG/DL
BUN SERPL-MCNC: 27 MG/DL
CALCIUM SERPL-MCNC: 9.8 MG/DL
CHLORIDE SERPL-SCNC: 100 MMOL/L
CHOLEST SERPL-MCNC: 189 MG/DL
CHOLEST/HDLC SERPL: 6.3 {RATIO}
CO2 SERPL-SCNC: 28 MMOL/L
CREAT SERPL-MCNC: 1.7 MG/DL
EST. GFR  (AFRICAN AMERICAN): 50.6 ML/MIN/1.73 M^2
EST. GFR  (NON AFRICAN AMERICAN): 43.8 ML/MIN/1.73 M^2
ESTIMATED AVG GLUCOSE: 206 MG/DL
GLUCOSE SERPL-MCNC: 210 MG/DL
HBA1C MFR BLD HPLC: 8.8 %
HDLC SERPL-MCNC: 30 MG/DL
HDLC SERPL: 15.9 %
LDLC SERPL CALC-MCNC: 101.8 MG/DL
NONHDLC SERPL-MCNC: 159 MG/DL
POTASSIUM SERPL-SCNC: 4.6 MMOL/L
PROT SERPL-MCNC: 7.5 G/DL
SODIUM SERPL-SCNC: 136 MMOL/L
TRIGL SERPL-MCNC: 286 MG/DL

## 2018-07-20 PROCEDURE — 36415 COLL VENOUS BLD VENIPUNCTURE: CPT | Mod: PO

## 2018-07-20 PROCEDURE — 90471 IMMUNIZATION ADMIN: CPT | Mod: PBBFAC,PO

## 2018-07-20 PROCEDURE — 99214 OFFICE O/P EST MOD 30 MIN: CPT | Mod: S$PBB,,, | Performed by: FAMILY MEDICINE

## 2018-07-20 PROCEDURE — 80061 LIPID PANEL: CPT

## 2018-07-20 PROCEDURE — 99999 PR PBB SHADOW E&M-EST. PATIENT-LVL III: CPT | Mod: PBBFAC,,, | Performed by: FAMILY MEDICINE

## 2018-07-20 PROCEDURE — 83036 HEMOGLOBIN GLYCOSYLATED A1C: CPT

## 2018-07-20 PROCEDURE — 82140 ASSAY OF AMMONIA: CPT

## 2018-07-20 PROCEDURE — 80053 COMPREHEN METABOLIC PANEL: CPT

## 2018-07-20 RX ORDER — INSULIN GLARGINE 100 [IU]/ML
INJECTION, SOLUTION SUBCUTANEOUS
Qty: 4 BOX | Refills: 4 | Status: SHIPPED | OUTPATIENT
Start: 2018-07-20

## 2018-07-20 RX ORDER — SPIRONOLACTONE 25 MG/1
25 TABLET ORAL DAILY PRN
Qty: 90 TABLET | Refills: 3
Start: 2018-07-20

## 2018-07-20 RX ORDER — METFORMIN HYDROCHLORIDE 1000 MG/1
500 TABLET ORAL 2 TIMES DAILY WITH MEALS
Qty: 180 TABLET | Refills: 4
Start: 2018-07-20 | End: 2021-02-01 | Stop reason: SDUPTHER

## 2018-07-20 RX ORDER — METOPROLOL SUCCINATE 100 MG/1
100 TABLET, EXTENDED RELEASE ORAL DAILY
Qty: 90 TABLET | Refills: 4 | Status: SHIPPED | OUTPATIENT
Start: 2018-07-20 | End: 2021-01-19 | Stop reason: SDUPTHER

## 2018-07-20 RX ORDER — AMLODIPINE BESYLATE 5 MG/1
5 TABLET ORAL DAILY
Qty: 90 TABLET | Refills: 3 | Status: SHIPPED | OUTPATIENT
Start: 2018-07-20 | End: 2021-01-19 | Stop reason: SDUPTHER

## 2018-07-20 RX ORDER — HYDRALAZINE HYDROCHLORIDE 25 MG/1
25 TABLET, FILM COATED ORAL 2 TIMES DAILY
Qty: 60 TABLET | Refills: 3 | Status: SHIPPED | OUTPATIENT
Start: 2018-07-20 | End: 2021-01-19 | Stop reason: SDUPTHER

## 2018-07-20 RX ORDER — DOXAZOSIN 4 MG/1
4 TABLET ORAL DAILY
Qty: 90 TABLET | Refills: 3 | Status: SHIPPED | OUTPATIENT
Start: 2018-07-20 | End: 2021-12-06 | Stop reason: SDUPTHER

## 2018-07-20 RX ORDER — INSULIN ASPART 100 [IU]/ML
15 INJECTION, SOLUTION INTRAVENOUS; SUBCUTANEOUS
Qty: 3 BOX | Refills: 3 | Status: SHIPPED | OUTPATIENT
Start: 2018-07-20 | End: 2019-07-20

## 2018-07-20 NOTE — PROGRESS NOTES
Two Patient identifiers used, Name and . VIS information given to patient and procedure was explained. Patient verbalized understanding. Engerix 1 ml administered IM in the left deltoid using sterile technique.  No residual bleeding noted. Patient tolerated procedure well.

## 2018-07-20 NOTE — PROGRESS NOTES
Subjective:       Patient ID: Joreg Sharp is a 58 y.o. male.    Chief Complaint: Hypertension and Diabetes    Hypertension   This is a chronic problem. The current episode started more than 1 year ago. The problem has been rapidly improving since onset. The problem is controlled. Pertinent negatives include no blurred vision, chest pain, headaches, malaise/fatigue, neck pain, orthopnea, palpitations or shortness of breath. The current treatment provides significant improvement. Compliance problems include exercise.  Identifiable causes of hypertension include chronic renal disease and sleep apnea.   Diabetes   He presents for his follow-up diabetic visit. He has type 2 diabetes mellitus. There are no hypoglycemic associated symptoms. Pertinent negatives for hypoglycemia include no dizziness or headaches. Pertinent negatives for diabetes include no blurred vision, no chest pain and no fatigue. Symptoms are improving.     Review of Systems   Constitutional: Negative for fatigue, malaise/fatigue and unexpected weight change.   Eyes: Negative for blurred vision.   Respiratory: Negative for chest tightness and shortness of breath.    Cardiovascular: Negative for chest pain, palpitations, orthopnea and leg swelling.   Gastrointestinal: Negative for abdominal pain.   Musculoskeletal: Negative for arthralgias and neck pain.   Neurological: Negative for dizziness, syncope, light-headedness and headaches.   Psychiatric/Behavioral: Positive for behavioral problems.       Patient Active Problem List   Diagnosis    DM II (diabetes mellitus, type II), controlled    HTN (hypertension), 2008    Abnormal cardiovascular stress test    Ventral hernia, repaired 12/12/2013    Incisional hernia, repaired 12/12/2013    STILES (dyspnea on exertion)    Peripheral edema    Fatigue    OA (osteoarthritis)    LVH (left ventricular hypertrophy)    Stress at home and work    Sleep deprivation    ED (erectile dysfunction)     Sedentary lifestyle    Type 2 diabetes mellitus with hyperglycemia    Essential hypertension    Hypertriglyceridemia    NAFL (nonalcoholic fatty liver)    Hypovitaminosis D    Plantar fasciitis, left    CKD (chronic kidney disease) stage 3, GFR 30-59 ml/min    Obesity       Objective:      Physical Exam   Constitutional: He is oriented to person, place, and time. He appears well-developed and well-nourished.   Cardiovascular: Normal rate, regular rhythm and normal heart sounds.    Pulses:       Dorsalis pedis pulses are 3+ on the right side, and 3+ on the left side.        Posterior tibial pulses are 3+ on the right side, and 3+ on the left side.   Pulmonary/Chest: Effort normal and breath sounds normal.   Musculoskeletal: He exhibits no edema.        Right foot: There is normal range of motion and no deformity.   Feet:   Right Foot:   Protective Sensation: 6 sites tested. 6 sites sensed.   Skin Integrity: Negative for ulcer.   Left Foot:   Protective Sensation: 6 sites tested. 6 sites sensed.   Skin Integrity: Negative for ulcer or blister.   Neurological: He is alert and oriented to person, place, and time.   Skin: Skin is warm and dry.   Psychiatric: He has a normal mood and affect.   Nursing note and vitals reviewed.      Lab Results   Component Value Date    WBC 5.36 07/29/2017    HGB 13.1 (L) 07/29/2017    HCT 38.7 (L) 07/29/2017     07/29/2017    CHOL 193 02/17/2018    TRIG 227 (H) 02/17/2018    HDL 34 (L) 02/17/2018    ALT 67 (H) 02/17/2018    AST 46 (H) 02/17/2018     02/17/2018    K 4.2 02/17/2018     02/17/2018    CREATININE 1.6 (H) 02/17/2018    BUN 19 02/17/2018    CO2 28 02/17/2018    TSH 1.798 09/16/2016    PSA 0.15 06/29/2012    INR 1.0 03/17/2017    HGBA1C 6.6 (H) 02/17/2018     The 10-year ASCVD risk score (Geetha PARIKH Jr., et al., 2013) is: 16.5%    Values used to calculate the score:      Age: 58 years      Sex: Male      Is Non- : No      Diabetic:  Yes      Tobacco smoker: No      Systolic Blood Pressure: 118 mmHg      Is BP treated: Yes      HDL Cholesterol: 34 mg/dL      Total Cholesterol: 193 mg/dL    Assessment:       1. Type 2 diabetes mellitus with hyperglycemia, with long-term current use of insulin    2. Renovascular hypertension    3. Hypertriglyceridemia    4. CKD (chronic kidney disease) stage 3, GFR 30-59 ml/min    5. Diabetes mellitus type 2 in obese    6. Essential hypertension        Plan:       Type 2 diabetes mellitus with hyperglycemia, with long-term current use of insulin  -     Comprehensive metabolic panel; Future; Expected date: 07/20/2018  -     Lipid panel; Future; Expected date: 07/20/2018  -     Hemoglobin A1c; Future; Expected date: 07/20/2018  -     Ammonia; Future; Expected date: 07/20/2018  -     (In Office Administered) Hepatitis B Vaccine (Adult) (IM); Standing  -     metFORMIN (GLUCOPHAGE) 1000 MG tablet; Take 0.5 tablets (500 mg total) by mouth 2 (two) times daily with meals.  Dispense: 180 tablet; Refill: 4  -     metoprolol succinate (TOPROL-XL) 100 MG 24 hr tablet; Take 1 tablet (100 mg total) by mouth once daily.  Dispense: 90 tablet; Refill: 4  -     amLODIPine (NORVASC) 5 MG tablet; Take 1 tablet (5 mg total) by mouth once daily.  Dispense: 90 tablet; Refill: 3  -     hydrALAZINE (APRESOLINE) 25 MG tablet; Take 1 tablet (25 mg total) by mouth 2 (two) times daily.  Dispense: 60 tablet; Refill: 3  -     spironolactone (ALDACTONE) 25 MG tablet; Take 1 tablet (25 mg total) by mouth daily as needed.  Dispense: 90 tablet; Refill: 3  -     insulin glargine (BASAGLAR KWIKPEN U-100 INSULIN) 100 unit/mL (3 mL) InPn pen; 60 units AM 20 units PM  Dispense: 4 Box; Refill: 4  -     doxazosin (CARDURA) 4 MG tablet; Take 1 tablet (4 mg total) by mouth once daily.  Dispense: 90 tablet; Refill: 3  -     insulin aspart U-100 (NOVOLOG FLEXPEN U-100 INSULIN) 100 unit/mL InPn pen; Inject 15 Units into the skin 3 (three) times daily with  meals. May need up to 50 units a day  Dispense: 3 Box; Refill: 3    Renovascular hypertension    Hypertriglyceridemia    CKD (chronic kidney disease) stage 3, GFR 30-59 ml/min    Diabetes mellitus type 2 in obese  -     metFORMIN (GLUCOPHAGE) 1000 MG tablet; Take 0.5 tablets (500 mg total) by mouth 2 (two) times daily with meals.  Dispense: 180 tablet; Refill: 4  -     metoprolol succinate (TOPROL-XL) 100 MG 24 hr tablet; Take 1 tablet (100 mg total) by mouth once daily.  Dispense: 90 tablet; Refill: 4    Essential hypertension  -     metoprolol succinate (TOPROL-XL) 100 MG 24 hr tablet; Take 1 tablet (100 mg total) by mouth once daily.  Dispense: 90 tablet; Refill: 4  -     amLODIPine (NORVASC) 5 MG tablet; Take 1 tablet (5 mg total) by mouth once daily.  Dispense: 90 tablet; Refill: 3  -     hydrALAZINE (APRESOLINE) 25 MG tablet; Take 1 tablet (25 mg total) by mouth 2 (two) times daily.  Dispense: 60 tablet; Refill: 3  -     spironolactone (ALDACTONE) 25 MG tablet; Take 1 tablet (25 mg total) by mouth daily as needed.  Dispense: 90 tablet; Refill: 3      Patient readiness: acceptance and barriers:none    During the course of the visit the patient was educated and counseled about the following:     Diabetes:  Discussed general issues about diabetes pathophysiology and management.  Hypertension:   Screening labs for initial evaluation: basic metabolic panel.  Screening for causes of secondary hypertension: GFR (chronic kidney disease).  Dietary sodium restriction.  Regular aerobic exercise.  Check blood pressures daily and record.  Follow up: 6 weeks and as needed.  Obesity:   General weight loss/lifestyle modification strategies discussed (elicit support from others; identify saboteurs; non-food rewards, etc).    Goals: Diabetes: Maintain Hemoglobin A1C below 7, Hypertension: Reduce Blood Pressure and Obesity: Reduce calorie intake and BMI    Did patient meet goals/outcomes: Yes    The following self management  tools provided: blood pressure log  blood glucose log  excercise log    Patient Instructions (the written plan) was given to the patient/family.     Time spent with patient: 30 minutes    Barriers to medications present (yes )    Adverse reactions to current medications (no)    Over the counter medications reviewed (Yes)        30 minutes spent counseling patient on diet, exercise, and weight loss.  This has been fully explained to the patient, who indicates understanding.

## 2018-10-11 ENCOUNTER — TELEPHONE (OUTPATIENT)
Dept: OPTOMETRY | Facility: CLINIC | Age: 58
End: 2018-10-11

## 2018-10-24 ENCOUNTER — PATIENT MESSAGE (OUTPATIENT)
Dept: FAMILY MEDICINE | Facility: CLINIC | Age: 58
End: 2018-10-24

## 2018-10-25 RX ORDER — PEN NEEDLE, DIABETIC 30 GX3/16"
NEEDLE, DISPOSABLE MISCELLANEOUS
Qty: 400 EACH | Refills: 3 | Status: SHIPPED | OUTPATIENT
Start: 2018-10-25

## 2019-01-04 ENCOUNTER — PATIENT OUTREACH (OUTPATIENT)
Dept: ADMINISTRATIVE | Facility: HOSPITAL | Age: 59
End: 2019-01-04

## 2019-01-04 DIAGNOSIS — E11.8 TYPE 2 DIABETES MELLITUS WITH COMPLICATION, WITH LONG-TERM CURRENT USE OF INSULIN: Primary | ICD-10-CM

## 2019-01-04 DIAGNOSIS — Z79.4 TYPE 2 DIABETES MELLITUS WITH COMPLICATION, WITH LONG-TERM CURRENT USE OF INSULIN: Primary | ICD-10-CM

## 2020-02-19 ENCOUNTER — PATIENT OUTREACH (OUTPATIENT)
Dept: ADMINISTRATIVE | Facility: HOSPITAL | Age: 60
End: 2020-02-19

## 2020-02-19 NOTE — LETTER
February 19, 2020    Jorge Sharp  [address]  [place] [address]     [facility]  [address]  [facility] [address]  Phone: [phone] Jorge Sharp  [address]  [place] [address]    Dear, [facility].  To help you get the most out of each of your visits, we will review your information to make sure you are up to date on all of your recommended tests and/or procedures.  Elias Allen MD  has found that your chart shows you may be due for the following:    Health Maintenance Due   Topic    Low Dose Statin     Hemoglobin A1c     Eye Exam     Foot Exam     Lipid Panel      If you have had any of the above done at another facility, please bring the records with you or Fax them to [phone] so that your record at [facility] will be complete. If you have not had any of these tests or procedures done recently and would like to complete this testing ,  please call [phone] or send a message through your [facility] portal to your provider's office.     If you have an upcoming scheduled appointment for the above test and/or procedures, please disregard this letter.    If you are currently taking medication, please bring it with you to your appointment for review.    Thank you for letting us care for you,    Christopher Ramires, Care Coordinator  [facility]  Phone: [phone]  Fax: [phone]

## 2020-02-19 NOTE — PROGRESS NOTES
Chart review completed 02/19/2020.  Care Everywhere updates requested and reviewed.  Immunizations reconciled. Media reviewed.     Letter mailed.

## 2020-05-05 ENCOUNTER — TELEPHONE (OUTPATIENT)
Dept: FAMILY MEDICINE | Facility: CLINIC | Age: 60
End: 2020-05-05

## 2020-05-05 ENCOUNTER — PATIENT MESSAGE (OUTPATIENT)
Dept: ADMINISTRATIVE | Facility: HOSPITAL | Age: 60
End: 2020-05-05

## 2020-05-05 NOTE — TELEPHONE ENCOUNTER
Mr. Sharp,    A provider has reviewed your chart and has determined you are due for an in office exam and labs.     Please be assured we are taking every precaution to keep our patients safe and healthy including temperature checks and mobile check in. All employees and patients will be wearing masks and rooms will be sanitized between patients. We are screening all patients and are following CDC protocol for infection control. Thank you for choosing [facility] and we look forward to taking care of you.    
Pt notified via pt portal  
[phone]

## 2020-10-05 ENCOUNTER — PATIENT MESSAGE (OUTPATIENT)
Dept: ADMINISTRATIVE | Facility: HOSPITAL | Age: 60
End: 2020-10-05

## 2021-01-04 ENCOUNTER — PATIENT MESSAGE (OUTPATIENT)
Dept: ADMINISTRATIVE | Facility: HOSPITAL | Age: 61
End: 2021-01-04

## 2021-01-19 ENCOUNTER — PATIENT MESSAGE (OUTPATIENT)
Dept: FAMILY MEDICINE | Facility: CLINIC | Age: 61
End: 2021-01-19

## 2021-01-19 DIAGNOSIS — Z79.4 TYPE 2 DIABETES MELLITUS WITH HYPERGLYCEMIA, WITH LONG-TERM CURRENT USE OF INSULIN: ICD-10-CM

## 2021-01-19 DIAGNOSIS — I10 ESSENTIAL HYPERTENSION: Chronic | ICD-10-CM

## 2021-01-19 DIAGNOSIS — E11.65 TYPE 2 DIABETES MELLITUS WITH HYPERGLYCEMIA, WITH LONG-TERM CURRENT USE OF INSULIN: ICD-10-CM

## 2021-01-19 DIAGNOSIS — E66.9 DIABETES MELLITUS TYPE 2 IN OBESE: ICD-10-CM

## 2021-01-19 DIAGNOSIS — E11.69 DIABETES MELLITUS TYPE 2 IN OBESE: ICD-10-CM

## 2021-01-19 RX ORDER — HYDRALAZINE HYDROCHLORIDE 25 MG/1
25 TABLET, FILM COATED ORAL 2 TIMES DAILY
Qty: 180 TABLET | Refills: 0 | Status: SHIPPED | OUTPATIENT
Start: 2021-01-19 | End: 2021-04-22 | Stop reason: SDUPTHER

## 2021-01-19 RX ORDER — METOPROLOL SUCCINATE 100 MG/1
100 TABLET, EXTENDED RELEASE ORAL DAILY
Qty: 90 TABLET | Refills: 0 | Status: SHIPPED | OUTPATIENT
Start: 2021-01-19 | End: 2021-04-22 | Stop reason: SDUPTHER

## 2021-01-19 RX ORDER — AMLODIPINE BESYLATE 5 MG/1
5 TABLET ORAL DAILY
Qty: 90 TABLET | Refills: 0 | Status: SHIPPED | OUTPATIENT
Start: 2021-01-19 | End: 2021-04-22 | Stop reason: SDUPTHER

## 2021-01-19 RX ORDER — LISINOPRIL 40 MG/1
40 TABLET ORAL DAILY
Qty: 90 TABLET | Refills: 0 | Status: SHIPPED | OUTPATIENT
Start: 2021-01-19 | End: 2021-04-22 | Stop reason: SDUPTHER

## 2021-01-20 ENCOUNTER — TELEPHONE (OUTPATIENT)
Dept: FAMILY MEDICINE | Facility: CLINIC | Age: 61
End: 2021-01-20

## 2021-01-25 ENCOUNTER — PATIENT MESSAGE (OUTPATIENT)
Dept: FAMILY MEDICINE | Facility: CLINIC | Age: 61
End: 2021-01-25

## 2021-02-01 ENCOUNTER — OFFICE VISIT (OUTPATIENT)
Dept: FAMILY MEDICINE | Facility: CLINIC | Age: 61
End: 2021-02-01

## 2021-02-01 ENCOUNTER — OCCUPATIONAL HEALTH (OUTPATIENT)
Dept: URGENT CARE | Facility: CLINIC | Age: 61
End: 2021-02-01

## 2021-02-01 VITALS
HEART RATE: 63 BPM | RESPIRATION RATE: 16 BRPM | DIASTOLIC BLOOD PRESSURE: 86 MMHG | WEIGHT: 209.44 LBS | TEMPERATURE: 98 F | BODY MASS INDEX: 31.02 KG/M2 | SYSTOLIC BLOOD PRESSURE: 150 MMHG | OXYGEN SATURATION: 96 % | HEIGHT: 69 IN

## 2021-02-01 DIAGNOSIS — E11.69 DIABETES MELLITUS TYPE 2 IN OBESE: ICD-10-CM

## 2021-02-01 DIAGNOSIS — E11.65 TYPE 2 DIABETES MELLITUS WITH HYPERGLYCEMIA, WITH LONG-TERM CURRENT USE OF INSULIN: ICD-10-CM

## 2021-02-01 DIAGNOSIS — Z13.6 ENCOUNTER FOR LIPID SCREENING FOR CARDIOVASCULAR DISEASE: ICD-10-CM

## 2021-02-01 DIAGNOSIS — E66.9 OBESITY, CLASS I, BMI 30-34.9: ICD-10-CM

## 2021-02-01 DIAGNOSIS — Z59.89 DOES NOT HAVE HEALTH INSURANCE: ICD-10-CM

## 2021-02-01 DIAGNOSIS — I10 ESSENTIAL HYPERTENSION: Primary | ICD-10-CM

## 2021-02-01 DIAGNOSIS — Z79.4 TYPE 2 DIABETES MELLITUS WITH HYPERGLYCEMIA, WITH LONG-TERM CURRENT USE OF INSULIN: ICD-10-CM

## 2021-02-01 DIAGNOSIS — Z13.220 ENCOUNTER FOR LIPID SCREENING FOR CARDIOVASCULAR DISEASE: ICD-10-CM

## 2021-02-01 DIAGNOSIS — E66.9 DIABETES MELLITUS TYPE 2 IN OBESE: ICD-10-CM

## 2021-02-01 PROCEDURE — 99999 PR PBB SHADOW E&M-EST. PATIENT-LVL V: CPT | Mod: PBBFAC,,, | Performed by: FAMILY MEDICINE

## 2021-02-01 PROCEDURE — 92552 PR PURE TONE AUDIOMETRY, AIR: ICD-10-PCS | Mod: S$GLB,,, | Performed by: EMERGENCY MEDICINE

## 2021-02-01 PROCEDURE — 97750 PHYSICAL PERFORMANCE TEST: CPT | Mod: S$GLB,,, | Performed by: EMERGENCY MEDICINE

## 2021-02-01 PROCEDURE — 99213 PR OFFICE/OUTPT VISIT, EST, LEVL III, 20-29 MIN: ICD-10-PCS | Mod: S$PBB,,, | Performed by: FAMILY MEDICINE

## 2021-02-01 PROCEDURE — 99499 UNLISTED E&M SERVICE: CPT | Mod: S$GLB,,, | Performed by: EMERGENCY MEDICINE

## 2021-02-01 PROCEDURE — 99213 OFFICE O/P EST LOW 20 MIN: CPT | Mod: S$PBB,,, | Performed by: FAMILY MEDICINE

## 2021-02-01 PROCEDURE — 97750 PR AGILITY TEST: ICD-10-PCS | Mod: S$GLB,,, | Performed by: EMERGENCY MEDICINE

## 2021-02-01 PROCEDURE — 92552 PURE TONE AUDIOMETRY AIR: CPT | Mod: S$GLB,,, | Performed by: EMERGENCY MEDICINE

## 2021-02-01 PROCEDURE — 99215 OFFICE O/P EST HI 40 MIN: CPT | Mod: PBBFAC,PO | Performed by: FAMILY MEDICINE

## 2021-02-01 PROCEDURE — 99499 PHYSICAL - BASIC COMPLEXITY: ICD-10-PCS | Mod: S$GLB,,, | Performed by: EMERGENCY MEDICINE

## 2021-02-01 PROCEDURE — 99999 PR PBB SHADOW E&M-EST. PATIENT-LVL V: ICD-10-PCS | Mod: PBBFAC,,, | Performed by: FAMILY MEDICINE

## 2021-02-01 RX ORDER — METFORMIN HYDROCHLORIDE 500 MG/1
500 TABLET ORAL 2 TIMES DAILY WITH MEALS
Qty: 180 TABLET | Refills: 1 | Status: SHIPPED | OUTPATIENT
Start: 2021-02-01 | End: 2021-02-05

## 2021-02-01 SDOH — SOCIAL DETERMINANTS OF HEALTH (SDOH): OTHER PROBLEMS RELATED TO HOUSING AND ECONOMIC CIRCUMSTANCES: Z59.89

## 2021-02-05 ENCOUNTER — PATIENT MESSAGE (OUTPATIENT)
Dept: FAMILY MEDICINE | Facility: CLINIC | Age: 61
End: 2021-02-05

## 2021-02-05 DIAGNOSIS — E11.65 TYPE 2 DIABETES MELLITUS WITH HYPERGLYCEMIA, WITH LONG-TERM CURRENT USE OF INSULIN: Primary | ICD-10-CM

## 2021-02-05 DIAGNOSIS — Z79.4 TYPE 2 DIABETES MELLITUS WITH HYPERGLYCEMIA, WITH LONG-TERM CURRENT USE OF INSULIN: Primary | ICD-10-CM

## 2021-02-05 RX ORDER — METFORMIN HYDROCHLORIDE 500 MG/1
500 TABLET, EXTENDED RELEASE ORAL 2 TIMES DAILY WITH MEALS
Qty: 180 TABLET | Refills: 3 | Status: SHIPPED | OUTPATIENT
Start: 2021-02-05 | End: 2021-04-22 | Stop reason: SDUPTHER

## 2021-02-09 ENCOUNTER — PATIENT MESSAGE (OUTPATIENT)
Dept: FAMILY MEDICINE | Facility: CLINIC | Age: 61
End: 2021-02-09

## 2021-04-22 ENCOUNTER — PATIENT MESSAGE (OUTPATIENT)
Dept: FAMILY MEDICINE | Facility: CLINIC | Age: 61
End: 2021-04-22

## 2021-04-24 ENCOUNTER — PATIENT MESSAGE (OUTPATIENT)
Dept: FAMILY MEDICINE | Facility: CLINIC | Age: 61
End: 2021-04-24

## 2021-04-26 ENCOUNTER — PATIENT MESSAGE (OUTPATIENT)
Dept: FAMILY MEDICINE | Facility: CLINIC | Age: 61
End: 2021-04-26

## 2021-05-03 ENCOUNTER — OFFICE VISIT (OUTPATIENT)
Dept: FAMILY MEDICINE | Facility: CLINIC | Age: 61
End: 2021-05-03

## 2021-05-03 VITALS
HEIGHT: 69 IN | DIASTOLIC BLOOD PRESSURE: 90 MMHG | BODY MASS INDEX: 29.71 KG/M2 | SYSTOLIC BLOOD PRESSURE: 146 MMHG | OXYGEN SATURATION: 98 % | TEMPERATURE: 99 F | WEIGHT: 200.63 LBS | HEART RATE: 69 BPM

## 2021-05-03 DIAGNOSIS — F43.9 STRESS AT HOME: ICD-10-CM

## 2021-05-03 DIAGNOSIS — E66.3 OVERWEIGHT (BMI 25.0-29.9): ICD-10-CM

## 2021-05-03 DIAGNOSIS — E11.65 TYPE 2 DIABETES MELLITUS WITH HYPERGLYCEMIA, WITH LONG-TERM CURRENT USE OF INSULIN: Primary | ICD-10-CM

## 2021-05-03 DIAGNOSIS — Z79.4 TYPE 2 DIABETES MELLITUS WITH HYPERGLYCEMIA, WITH LONG-TERM CURRENT USE OF INSULIN: Primary | ICD-10-CM

## 2021-05-03 DIAGNOSIS — I10 ESSENTIAL HYPERTENSION: ICD-10-CM

## 2021-05-03 PROCEDURE — 99214 OFFICE O/P EST MOD 30 MIN: CPT | Mod: S$PBB,,, | Performed by: FAMILY MEDICINE

## 2021-05-03 PROCEDURE — 99215 OFFICE O/P EST HI 40 MIN: CPT | Mod: PBBFAC,PO | Performed by: FAMILY MEDICINE

## 2021-05-03 PROCEDURE — 99999 PR PBB SHADOW E&M-EST. PATIENT-LVL V: ICD-10-PCS | Mod: PBBFAC,,, | Performed by: FAMILY MEDICINE

## 2021-05-03 PROCEDURE — 99214 PR OFFICE/OUTPT VISIT, EST, LEVL IV, 30-39 MIN: ICD-10-PCS | Mod: S$PBB,,, | Performed by: FAMILY MEDICINE

## 2021-05-03 PROCEDURE — 99999 PR PBB SHADOW E&M-EST. PATIENT-LVL V: CPT | Mod: PBBFAC,,, | Performed by: FAMILY MEDICINE

## 2021-05-03 RX ORDER — AMLODIPINE BESYLATE 10 MG/1
10 TABLET ORAL DAILY
Qty: 90 TABLET | Refills: 0 | Status: SHIPPED | OUTPATIENT
Start: 2021-05-03 | End: 2021-12-06 | Stop reason: SDUPTHER

## 2021-05-03 RX ORDER — METFORMIN HYDROCHLORIDE 500 MG/1
1000 TABLET, EXTENDED RELEASE ORAL 2 TIMES DAILY WITH MEALS
Qty: 360 TABLET | Refills: 0 | Status: SHIPPED | OUTPATIENT
Start: 2021-05-03 | End: 2021-12-06 | Stop reason: SDUPTHER

## 2021-05-12 ENCOUNTER — TELEPHONE (OUTPATIENT)
Dept: FAMILY MEDICINE | Facility: CLINIC | Age: 61
End: 2021-05-12

## 2021-05-18 ENCOUNTER — TELEPHONE (OUTPATIENT)
Dept: FAMILY MEDICINE | Facility: CLINIC | Age: 61
End: 2021-05-18

## 2021-05-20 ENCOUNTER — PATIENT MESSAGE (OUTPATIENT)
Dept: FAMILY MEDICINE | Facility: CLINIC | Age: 61
End: 2021-05-20

## 2021-05-20 ENCOUNTER — TELEPHONE (OUTPATIENT)
Dept: FAMILY MEDICINE | Facility: CLINIC | Age: 61
End: 2021-05-20

## 2021-06-14 ENCOUNTER — PATIENT MESSAGE (OUTPATIENT)
Dept: FAMILY MEDICINE | Facility: CLINIC | Age: 61
End: 2021-06-14

## 2021-08-16 ENCOUNTER — PATIENT OUTREACH (OUTPATIENT)
Dept: ADMINISTRATIVE | Facility: HOSPITAL | Age: 61
End: 2021-08-16

## 2021-08-16 ENCOUNTER — PATIENT MESSAGE (OUTPATIENT)
Dept: ADMINISTRATIVE | Facility: HOSPITAL | Age: 61
End: 2021-08-16

## 2021-08-16 DIAGNOSIS — Z79.4 TYPE 2 DIABETES MELLITUS WITH HYPERGLYCEMIA, WITH LONG-TERM CURRENT USE OF INSULIN: Primary | ICD-10-CM

## 2021-08-16 DIAGNOSIS — E11.65 TYPE 2 DIABETES MELLITUS WITH HYPERGLYCEMIA, WITH LONG-TERM CURRENT USE OF INSULIN: Primary | ICD-10-CM

## 2021-12-06 ENCOUNTER — PATIENT MESSAGE (OUTPATIENT)
Dept: FAMILY MEDICINE | Facility: CLINIC | Age: 61
End: 2021-12-06

## 2021-12-06 DIAGNOSIS — I10 ESSENTIAL HYPERTENSION: ICD-10-CM

## 2021-12-06 DIAGNOSIS — E66.9 DIABETES MELLITUS TYPE 2 IN OBESE: ICD-10-CM

## 2021-12-06 DIAGNOSIS — E11.69 DIABETES MELLITUS TYPE 2 IN OBESE: ICD-10-CM

## 2021-12-06 DIAGNOSIS — E11.65 TYPE 2 DIABETES MELLITUS WITH HYPERGLYCEMIA, WITH LONG-TERM CURRENT USE OF INSULIN: ICD-10-CM

## 2021-12-06 DIAGNOSIS — R94.39 ABNORMAL DOBUTAMINE STRESS ECHO: ICD-10-CM

## 2021-12-06 DIAGNOSIS — Z79.4 TYPE 2 DIABETES MELLITUS WITH HYPERGLYCEMIA, WITH LONG-TERM CURRENT USE OF INSULIN: ICD-10-CM

## 2021-12-06 RX ORDER — METFORMIN HYDROCHLORIDE 500 MG/1
1000 TABLET, EXTENDED RELEASE ORAL 2 TIMES DAILY WITH MEALS
Qty: 120 TABLET | Refills: 0 | Status: SHIPPED | OUTPATIENT
Start: 2021-12-06 | End: 2022-01-05

## 2021-12-06 RX ORDER — METOPROLOL SUCCINATE 100 MG/1
100 TABLET, EXTENDED RELEASE ORAL DAILY
Qty: 30 TABLET | Refills: 0 | Status: SHIPPED | OUTPATIENT
Start: 2021-12-06 | End: 2022-05-24 | Stop reason: SDUPTHER

## 2021-12-06 RX ORDER — HYDRALAZINE HYDROCHLORIDE 25 MG/1
25 TABLET, FILM COATED ORAL 2 TIMES DAILY
Qty: 30 TABLET | Refills: 0 | Status: SHIPPED | OUTPATIENT
Start: 2021-12-06

## 2021-12-06 RX ORDER — AMLODIPINE BESYLATE 10 MG/1
10 TABLET ORAL DAILY
Qty: 30 TABLET | Refills: 0 | Status: SHIPPED | OUTPATIENT
Start: 2021-12-06 | End: 2022-01-05

## 2021-12-06 RX ORDER — DOXAZOSIN 4 MG/1
4 TABLET ORAL DAILY
Qty: 30 TABLET | Refills: 0 | Status: SHIPPED | OUTPATIENT
Start: 2021-12-06

## 2021-12-06 RX ORDER — LIRAGLUTIDE 6 MG/ML
1.8 INJECTION SUBCUTANEOUS DAILY
Qty: 3 ML | Refills: 2
Start: 2021-12-06 | End: 2022-01-05

## 2021-12-06 RX ORDER — NAPROXEN SODIUM 220 MG/1
81 TABLET, FILM COATED ORAL DAILY
Qty: 30 TABLET | Refills: 0 | Status: SHIPPED | OUTPATIENT
Start: 2021-12-06 | End: 2022-12-06

## 2021-12-06 RX ORDER — LISINOPRIL 40 MG/1
40 TABLET ORAL DAILY
Qty: 30 TABLET | Refills: 0 | Status: SHIPPED | OUTPATIENT
Start: 2021-12-06

## 2024-07-18 NOTE — PATIENT INSTRUCTIONS
Diabetes (General Information)  Diabetes is a long-term health problem. It means your body does not make enough insulin. Or it may mean that your body cannot use the insulin it makes. Insulin is a hormone in your body. It lets blood sugar (glucose) reach the cells in your body. All of your cells need glucose for fuel.  When you have diabetes, the glucose in your blood builds up because it cannot get into the cells. This buildup is called high blood sugar (hyperglycemia).  Your blood sugar level depends on several things. It depends on what kind of food you eat and how much of it you eat. It also depends on how much exercise you get, and how much insulin you have in your body. Eating too much of the wrong kinds of food or not taking diabetes medicine on time can cause high blood sugar. Infections can cause high blood sugar even if you are taking medicines correctly.  These things can also cause low blood sugar:  · Missing meals  · Not eating enough food  · Taking too much diabetes medicine  Diabetes can cause serious problems over time if you do not get treated. These problems include heart disease, stroke, kidney failure, and blindness. They also include nerve pain or loss of feeling in your legs and feet, and gangrene of the feet. By keeping your blood sugar under control you can prevent or delay these problems.  Normal blood sugar levels are 80 to 100 before a meal and less than 180 in the 1 to 2 hours after a meal.  Home care  Follow these guidelines when caring for yourself at home:  · Follow the diet your healthcare provider gives you. Take insulin or other diabetes medicine exactly as told to.  · Watch your blood sugar as you are told to. Keep a log of your results. This will help your provider change your medicines to keep your blood sugar under control.  · Try to reach your ideal weight. You may be able to cut back on or not have to take diabetes medicine if you eat the right foods and get exercise.  · Do  Goal Outcome Evaluation:  Plan of Care Reviewed With: patient        Progress: no change  Outcome Summary: VSS. Pulses 2+ with doppler, and palpable. PRN pain med given x1 with good results. R groin site with small amount of sero-sagn drainage, L groin site with scant amount of old bloody drainage noted. IVF as ordered. Will continue to monitor.   not smoke. Smoking worsens the effects of diabetes on your circulation. You are much more likely to have a heart attack if you have diabetes and you smoke.  · Take good care of your feet. If you have lost feeling in your feet, you may not see an injury or infection. Check your feet and between your toes at least once a week.  · Wear a medical alert bracelet or necklace, or carry a card in your wallet that says you have diabetes. This will help healthcare providers give you the right care if you get very ill and cannot tell them that you have diabetes.  Sick day plan  If you get a cold, the flu, or a bacterial or viral infection, take these steps:  · Look at your diabetes sick plan and call your healthcare provider as you were told to. You may need to call your provider right away if:  ¨ Your blood sugar is above 240 while taking your diabetes medicine  ¨ Your urine ketone levels are above normal or high  ¨ You have been vomiting more than 6 hours  ¨ You have trouble breathing or your breath ha s a fruity smell  ¨ You have a high fever  ¨ You have a fever for several days and you are not getting better  ¨ You get light-headed and are sleepier than usual  · Keep taking your diabetes pills (oral medicine) even if you have been vomiting and are feeling sick. Call your provider right away because you may need insulin to lower your blood sugar until you recover from your illness.  · Keep taking your insulin even if you have been vomiting and are feeling sick. Call your provider right away to ask if you need to change your insulin dose. This will depend on your blood sugar results.  · Check your blood sugar every 2 to 4 hours, or at least 4 times a day.  · Check your ketones often. If you are vomiting and having diarrhea, watch them more often.  · Do not skip meals. Try to eat small meals on a regular schedule. Do this even if you do not feel like eating.  · Drink water or other liquids that do not have caffeine or  calories. This will keep you from getting dehydrated. If you are nauseated or vomiting, takes small sips every 5 minutes. To prevent dehydration try to drink a cup (8 ounces) of fluids every hour while you are awake.  General care  Always bring a source of fast-acting sugar with you in case you have symptoms of low blood sugar (below 70). At the first sign of low blood sugar, eat or drink 15 to 20 grams of fast-acting sugar to raise your blood sugar. Examples are:  · 3 to 4 glucose tablets. You can buy these at most [facility].  · 4 ounces (1/2 cup) of regular (not diet) soft drinks  · 4 ounces (1/2 cup) of any fruit juice  · 8 ounces (1 cup) of milk  · 5 to 6 pieces of hard candy  · 1 tablespoon of honey  Check your blood sugar 15 minutes after treating yourself. If it is still below 70, take 15 to 20 more grams of fast-acting sugar. Test again in 15 minutes. If it returns to normal (70 or above), eat a snack or meal to keep your blood sugar in a safe range. If it stays low, call your doctor or go to an emergency room.  Follow-up care  Follow-up with your healthcare provider, or as advised. For more information about diabetes, visit the American Diabetes Association website at www.diabetes.org or call 850-579-2031.  When to seek medical advice  Call your healthcare provider right away if you have any of these symptoms of high blood sugar:  · Frequent urination  · Dizziness  · Drowsiness  · Thirst  · Headache  · Nausea or vomiting  · Abdominal pain  · Eyesight changes  · Fast breathing  · Confusion or loss of consciousness  Also call your provider right away if you have any of these signs of low blood sugar:  · Fatigue  · Headache  · Shakes  · Excess sweating  · Hunger  · Feeling anxious or restless  · Eyesight changes  · Drowsiness  · Weakness  · Confusion or loss of consciousness  Call 911  Call for emergency help right away if any of these occur:  · Chest pain or shortness of breath  · Dizziness or  fainting  · Weakness of an arm or leg or one side of the face  · Trouble speaking or seeing   Date Last Reviewed: 6/1/2016  © 0274-9240 [employer]. [address] [place] [address], [place] [address]. All rights reserved. This information is not intended as a substitute for professional medical care. Always follow your healthcare professional's instructions.            Walking for Fitness  Fitness walking has something for everyone, even people who are already fit. Walking is one of the safest ways to condition your body aerobically. It can boost energy, help you lose weight, and reduce stress.    Physical benefits  · Walking strengthens your heart and lungs, and tones your muscles.  · When walking, your feet land with less impact than in other sports. This reduces chances of muscle, bone, and joint injury.  · Regular walking improves your cholesterol levels and lowers your risk of heart disease. And it helps you control your blood sugar if you have diabetes.  · Walking is a weight-bearing activity, which helps maintain bone density. This can help prevent osteoporosis.  Personal rewards  · Taking walks can help you relax and manage stress. And fitness walking may make you feel better about yourself.  · Walking can help you sleep better at night and make you less likely to be depressed.  · Regular walking may help maintain your memory as you get older.  · Walking is a great way to spend extra time with friends and family members. Be sure to invite your dog along!  Q&A about fitness walking  Q: Will walking keep me fit?  A: Yes. Regular walking at the right pace gives you all the benefits of other aerobic activities, such as jogging and swimming.  Q: Will walking help me lose weight and keep it off?  A: Yes. Per mile, walking can burn as many calories as jogging. Your health care provider can help work walking into your weight-loss plan.  Q: Is walking safe for my health?  A: Yes. Walking is safe if you have high  blood pressure, diabetes, heart disease, or other conditions. Talk to your healthcare provider before you start.  Date Last Reviewed: 4/1/2017 © 2000-2017 [employer]. [address] [place] [address], [place] [address]. All rights reserved. This information is not intended as a substitute for professional medical care. Always follow your healthcare professional's instructions.          Weight Management: Getting Started  Healthy bodies come in all shapes and sizes. Not all bodies are made to be thin. For some people, a healthy weight is higher than the average weight listed on weight charts. Your healthcare provider can help you decide on a healthy weight for you.    Reasons to lose weight  Losing weight can help with some health problems, such as high blood pressure, heart disease, diabetes, sleep apnea, and arthritis. You may also feel more energy.  Set your long-term goal  Your goal doesn't even have to be a specific weight. You may decide on a fitness goal (such as being able to walk 10 miles a week), or a health goal (such as lowering your blood pressure). Choose a goal that is measurable and reasonable, so you know when you've reached it. A goal of reaching a BMI of less than 25 is not always reasonable (or possible).   Make an action plan  Habits dont change overnight. Setting your goals too high can leave you feeling discouraged if you cant reach them. Be realistic. Choose one or two small changes you can make now. Set an action plan for how you are going to make these changes. When you can stick to this plan, keep making a few more small changes. Taking small steps will help you stay on the path to success.  Track your progress  Write down your goals. Then, keep a daily record of your progress. Write down what you eat and how active you are. This record lets you look back on how much youve done. It may also help when youre feeling frustrated. Reward yourself for success. Even if you dont reach  every goal, give yourself credit for what you do get done.  Get support  Encouragement from others can help make losing weight easier. Ask your family members and friends for support. They may even want to join you. Also look to your healthcare provider, registered dietitian, and  for help. Your local hospital can give you more information about nutrition, exercise, and weight loss.  Date Last Reviewed: 1/31/2016 © 2000-2017 [employer]. [address] [place] [address], [place] [address]. All rights reserved. This information is not intended as a substitute for professional medical care. Always follow your healthcare professional's instructions.            Established High Blood Pressure    High blood pressure (hypertension) is a chronic disease. Often, healthcare providers dont know what causes it. But it can be caused by certain health conditions and medicines.  If you have high blood pressure, you may not have any symptoms. If you do have symptoms, they may include headache, dizziness, changes in your vision, chest pain, and shortness of breath. But even without symptoms, high blood pressure thats not treated raises your risk for heart attack and stroke. High blood pressure is a serious health risk and shouldnt be ignored.  A blood pressure reading is made up of two numbers: a higher number over a lower number. The top number is the systolic pressure. The bottom number is the diastolic pressure. A normal blood pressure is a systolic pressure of  less than 120 over a diastolic pressure of less than 80. You will see your blood pressure readings written together. For example, a person with a systolic pressure of 188 and a diastolic pressure of 78 will have 118/78 written in the medical record.  High blood pressure is when either the top number is 140 or higher, or the bottom number is 90 or higher. This must be the result when taking your blood pressure a number of times. The blood  pressures between normal and high are called prehypertension.  Home care  If you have high blood pressure, you should do what is listed below to lower your blood pressure. If you are taking medicines for high blood pressure, these methods may reduce or end your need for medicines in the future.  · Begin a weight-loss program if you are overweight.  · Cut back on how much salt you get in your diet. Heres how to do this:  ¨ Dont eat foods that have a lot of salt. These include olives, pickles, smoked meats, and salted potato chips.  ¨ Dont add salt to your food at the table.  ¨ Use only small amounts of salt when cooking.  · Start an exercise program. Talk with your healthcare provider about the type of exercise program that would be best for you. It doesn't have to be hard. Even brisk walking for 20 minutes 3 times a week is a good form of exercise.  · Dont take medicines that stimulate the heart. This includes many over-the-counter cold and sinus decongestant pills and sprays, as well as diet pills. Check the warnings about hypertension on the label. Before buying any over-the-counter medicines or supplements, always ask the pharmacist about the product's potential interaction with your high blood pressure and your high blood pressure medicines.  · Stimulants such as amphetamine or cocaine could be deadly for someone with high blood pressure. Never take these.  · Limit how much caffeine you get in your diet. Switch to caffeine-free products.  · Stop smoking. If you are a long-time smoker, this can be hard. Talk to your healthcare provider about medicines and nicotine replacement options to help you. Also, enroll in a stop-smoking program to make it more likely that you will quit for good.  · Learn how to handle stress. This is an important part of any program to lower blood pressure. Learn about relaxation methods like meditation, yoga, or biofeedback.  · If your provider prescribed medicines, take them exactly  as directed. Missing doses may cause your blood pressure get out of control.  · If you miss a dose or doses, check with your healthcare provider or pharmacist about what to do.  · Consider buying an automatic blood pressure machine. Ask your provider for a recommendation. You can get one of these at most pharmacies.     The American Heart Association recommends the following guidelines for home blood pressure monitoring:  · Don't smoke or drink coffee for 30 minutes before taking your blood pressure.  · Go to the bathroom before the test.  · Relax for 5 minutes before taking the measurement.  · Sit with your back supported (don't sit on a couch or soft chair); keep your feet on the floor uncrossed. Place your arm on a solid flat surface (like a table) with the upper part of the arm at heart level. Place the middle of the cuff directly above the eye of the elbow. Check the monitor's instruction manual for an illustration.  · Take multiple readings. When you measure, take 2 to 3 readings one minute apart and record all of the results.  · Take your blood pressure at the same time every day, or as your healthcare provider recommends.  · Record the date, time, and blood pressure reading.  · Take the record with you to your next medical appointment. If your blood pressure monitor has a built-in memory, simply take the monitor with you to your next appointment.  · Call your provider if you have several high readings. Don't be frightened by a single high blood pressure reading, but if you get several high readings, check in with your healthcare provider.  · Note: When blood pressure reaches a systolic (top number) of 180 or higher OR diastolic (bottom number) of 110 or higher, seek emergency medical treatment.  Follow-up care  You will need to see your healthcare provider regularly. This is to check your blood pressure and to make changes to your medicines. Make a follow-up appointment as directed. Bring the record of your  home blood pressure readings to the appointment.  When to seek medical advice  Call your healthcare provider right away if any of these occur:  · Blood pressure reaches a systolic (upper number) of 180 or higher OR a diastolic (bottom number) of 110 or higher  · Chest pain or shortness of breath  · Severe headache  · Throbbing or rushing sound in the ears  · Nosebleed  · Sudden severe pain in your belly (abdomen)  · Extreme drowsiness, confusion, or fainting  · Dizziness or spinning sensation (vertigo)  · Weakness of an arm or leg or one side of the face  · You have problems speaking or seeing   Date Last Reviewed: 12/1/2016  © 8152-6837 [employer]. [address] [place] [address], [place] [address]. All rights reserved. This information is not intended as a substitute for professional medical care. Always follow your healthcare professional's instructions.             83